# Patient Record
Sex: MALE | Race: WHITE | NOT HISPANIC OR LATINO | Employment: OTHER | URBAN - METROPOLITAN AREA
[De-identification: names, ages, dates, MRNs, and addresses within clinical notes are randomized per-mention and may not be internally consistent; named-entity substitution may affect disease eponyms.]

---

## 2017-12-07 PROBLEM — A41.9 SEVERE SEPSIS: Status: ACTIVE | Noted: 2017-12-07

## 2017-12-07 PROBLEM — I25.10 CORONARY ARTERY DISEASE INVOLVING NATIVE CORONARY ARTERY WITHOUT ANGINA PECTORIS: Status: ACTIVE | Noted: 2017-12-07

## 2017-12-07 PROBLEM — R65.20 SEVERE SEPSIS: Status: ACTIVE | Noted: 2017-12-07

## 2017-12-07 PROBLEM — I10 HYPERTENSION: Status: ACTIVE | Noted: 2017-12-07

## 2017-12-07 PROBLEM — Z72.0 TOBACCO ABUSE: Status: ACTIVE | Noted: 2017-12-07

## 2017-12-07 PROBLEM — N49.2 SCROTAL ABSCESS: Status: ACTIVE | Noted: 2017-12-07

## 2017-12-07 PROBLEM — E11.65 POORLY CONTROLLED TYPE 2 DIABETES MELLITUS: Status: ACTIVE | Noted: 2017-12-07

## 2017-12-11 PROBLEM — R65.20 SEVERE SEPSIS: Status: RESOLVED | Noted: 2017-12-07 | Resolved: 2017-12-11

## 2017-12-11 PROBLEM — A41.9 SEVERE SEPSIS: Status: RESOLVED | Noted: 2017-12-07 | Resolved: 2017-12-11

## 2017-12-13 PROBLEM — L98.492 CHRONIC ULCER SKIN, WITH FAT LAYER EXPOSED: Status: ACTIVE | Noted: 2017-12-13

## 2017-12-24 ENCOUNTER — HOSPITAL ENCOUNTER (INPATIENT)
Facility: HOSPITAL | Age: 52
LOS: 1 days | Discharge: HOME | DRG: 064 | End: 2017-12-25
Attending: EMERGENCY MEDICINE | Admitting: PSYCHIATRY & NEUROLOGY
Payer: MEDICARE

## 2017-12-24 DIAGNOSIS — N49.2 SCROTAL ABSCESS: ICD-10-CM

## 2017-12-24 DIAGNOSIS — R53.1 WEAKNESS: ICD-10-CM

## 2017-12-24 DIAGNOSIS — I63.441 EMBOLIC STROKE INVOLVING RIGHT CEREBELLAR ARTERY: Primary | ICD-10-CM

## 2017-12-24 DIAGNOSIS — E78.5 HYPERLIPIDEMIA, UNSPECIFIED HYPERLIPIDEMIA TYPE: ICD-10-CM

## 2017-12-24 DIAGNOSIS — I63.9 ISCHEMIC STROKE: ICD-10-CM

## 2017-12-24 DIAGNOSIS — Z92.82 S/P ADMN TPA IN DIFF FAC W/N LAST 24 HR BEF ADM TO CRNT FAC: ICD-10-CM

## 2017-12-24 DIAGNOSIS — I25.10 CORONARY ARTERY DISEASE INVOLVING NATIVE CORONARY ARTERY OF NATIVE HEART WITHOUT ANGINA PECTORIS: ICD-10-CM

## 2017-12-24 LAB
ABO + RH BLD: NORMAL
ALBUMIN SERPL BCP-MCNC: 3.4 G/DL
ALP SERPL-CCNC: 73 U/L
ALT SERPL W/O P-5'-P-CCNC: 13 U/L
AMPHET+METHAMPHET UR QL: NEGATIVE
ANION GAP SERPL CALC-SCNC: 16 MMOL/L
AST SERPL-CCNC: 16 U/L
BACTERIA #/AREA URNS AUTO: ABNORMAL /HPF
BARBITURATES UR QL SCN>200 NG/ML: NEGATIVE
BASOPHILS # BLD AUTO: 0.1 K/UL
BASOPHILS NFR BLD: 0.3 %
BENZODIAZ UR QL SCN>200 NG/ML: NEGATIVE
BILIRUB SERPL-MCNC: 0.3 MG/DL
BILIRUB UR QL STRIP: NEGATIVE
BLD GP AB SCN CELLS X3 SERPL QL: NORMAL
BUN SERPL-MCNC: 18 MG/DL
BZE UR QL SCN: NEGATIVE
CALCIUM SERPL-MCNC: 9.5 MG/DL
CANNABINOIDS UR QL SCN: NEGATIVE
CHLORIDE SERPL-SCNC: 100 MMOL/L
CHOLEST SERPL-MCNC: 255 MG/DL
CHOLEST/HDLC SERPL: 5.9 {RATIO}
CLARITY UR REFRACT.AUTO: ABNORMAL
CO2 SERPL-SCNC: 20 MMOL/L
COLOR UR AUTO: YELLOW
CREAT SERPL-MCNC: 1.3 MG/DL
CREAT UR-MCNC: 69 MG/DL
DIFFERENTIAL METHOD: ABNORMAL
EOSINOPHIL # BLD AUTO: 0.1 K/UL
EOSINOPHIL NFR BLD: 0.3 %
ERYTHROCYTE [DISTWIDTH] IN BLOOD BY AUTOMATED COUNT: 12.5 %
EST. GFR  (AFRICAN AMERICAN): >60 ML/MIN/1.73 M^2
EST. GFR  (NON AFRICAN AMERICAN): >60 ML/MIN/1.73 M^2
ESTIMATED AVG GLUCOSE: 292 MG/DL
GLUCOSE SERPL-MCNC: 313 MG/DL
GLUCOSE UR QL STRIP: ABNORMAL
HBA1C MFR BLD HPLC: 11.8 %
HCT VFR BLD AUTO: 38.9 %
HDLC SERPL-MCNC: 43 MG/DL
HDLC SERPL: 16.9 %
HGB BLD-MCNC: 13 G/DL
HGB UR QL STRIP: NEGATIVE
HYALINE CASTS UR QL AUTO: 20 /LPF
IMM GRANULOCYTES # BLD AUTO: 0.47 K/UL
IMM GRANULOCYTES NFR BLD AUTO: 1.6 %
INR PPP: 1.3
KETONES UR QL STRIP: NEGATIVE
LDLC SERPL CALC-MCNC: 164.6 MG/DL
LEUKOCYTE ESTERASE UR QL STRIP: NEGATIVE
LYMPHOCYTES # BLD AUTO: 2.1 K/UL
LYMPHOCYTES NFR BLD: 6.9 %
MAGNESIUM SERPL-MCNC: 1.7 MG/DL
MCH RBC QN AUTO: 30 PG
MCHC RBC AUTO-ENTMCNC: 33.4 G/DL
MCV RBC AUTO: 90 FL
METHADONE UR QL SCN>300 NG/ML: NEGATIVE
MICROSCOPIC COMMENT: ABNORMAL
MONOCYTES # BLD AUTO: 2.3 K/UL
MONOCYTES NFR BLD: 7.8 %
NEUTROPHILS # BLD AUTO: 24.9 K/UL
NEUTROPHILS NFR BLD: 83.1 %
NITRITE UR QL STRIP: NEGATIVE
NONHDLC SERPL-MCNC: 212 MG/DL
NRBC BLD-RTO: 0 /100 WBC
OPIATES UR QL SCN: NEGATIVE
PCP UR QL SCN>25 NG/ML: NEGATIVE
PH UR STRIP: 5 [PH] (ref 5–8)
PHOSPHATE SERPL-MCNC: 4.1 MG/DL
PLATELET # BLD AUTO: 375 K/UL
PLATELET BLD QL SMEAR: ABNORMAL
PMV BLD AUTO: 10.4 FL
POCT GLUCOSE: 174 MG/DL (ref 70–110)
POCT GLUCOSE: 231 MG/DL (ref 70–110)
POCT GLUCOSE: 288 MG/DL (ref 70–110)
POTASSIUM SERPL-SCNC: 4.3 MMOL/L
PROT SERPL-MCNC: 7.8 G/DL
PROT UR QL STRIP: ABNORMAL
PROTHROMBIN TIME: 13.2 SEC
RBC # BLD AUTO: 4.34 M/UL
RBC #/AREA URNS AUTO: 3 /HPF (ref 0–4)
SODIUM SERPL-SCNC: 136 MMOL/L
SP GR UR STRIP: 1.01 (ref 1–1.03)
SQUAMOUS #/AREA URNS AUTO: 0 /HPF
TOXICOLOGY INFORMATION: NORMAL
TRIGL SERPL-MCNC: 237 MG/DL
TSH SERPL DL<=0.005 MIU/L-ACNC: 1.86 UIU/ML
URN SPEC COLLECT METH UR: ABNORMAL
UROBILINOGEN UR STRIP-ACNC: NEGATIVE EU/DL
WBC # BLD AUTO: 29.96 K/UL
WBC #/AREA URNS AUTO: 3 /HPF (ref 0–5)
YEAST UR QL AUTO: ABNORMAL

## 2017-12-24 PROCEDURE — A4216 STERILE WATER/SALINE, 10 ML: HCPCS | Performed by: NURSE PRACTITIONER

## 2017-12-24 PROCEDURE — 25000003 PHARM REV CODE 250: Performed by: NURSE PRACTITIONER

## 2017-12-24 PROCEDURE — 97802 MEDICAL NUTRITION INDIV IN: CPT

## 2017-12-24 PROCEDURE — 87040 BLOOD CULTURE FOR BACTERIA: CPT | Mod: 59

## 2017-12-24 PROCEDURE — 99223 1ST HOSP IP/OBS HIGH 75: CPT | Mod: AI,,, | Performed by: NURSE PRACTITIONER

## 2017-12-24 PROCEDURE — G8997 SWALLOW GOAL STATUS: HCPCS | Mod: CH

## 2017-12-24 PROCEDURE — 85610 PROTHROMBIN TIME: CPT | Mod: 91

## 2017-12-24 PROCEDURE — S4991 NICOTINE PATCH NONLEGEND: HCPCS | Performed by: NURSE PRACTITIONER

## 2017-12-24 PROCEDURE — 63600175 PHARM REV CODE 636 W HCPCS: Performed by: NURSE PRACTITIONER

## 2017-12-24 PROCEDURE — 86901 BLOOD TYPING SEROLOGIC RH(D): CPT

## 2017-12-24 PROCEDURE — 99285 EMERGENCY DEPT VISIT HI MDM: CPT | Mod: ,,, | Performed by: EMERGENCY MEDICINE

## 2017-12-24 PROCEDURE — 81001 URINALYSIS AUTO W/SCOPE: CPT

## 2017-12-24 PROCEDURE — 99285 EMERGENCY DEPT VISIT HI MDM: CPT | Mod: 25

## 2017-12-24 PROCEDURE — 82962 GLUCOSE BLOOD TEST: CPT

## 2017-12-24 PROCEDURE — 96372 THER/PROPH/DIAG INJ SC/IM: CPT

## 2017-12-24 PROCEDURE — 80053 COMPREHEN METABOLIC PANEL: CPT | Mod: 91

## 2017-12-24 PROCEDURE — 93010 ELECTROCARDIOGRAM REPORT: CPT | Mod: ,,, | Performed by: INTERNAL MEDICINE

## 2017-12-24 PROCEDURE — 20000000 HC ICU ROOM

## 2017-12-24 PROCEDURE — 86900 BLOOD TYPING SEROLOGIC ABO: CPT

## 2017-12-24 PROCEDURE — 83735 ASSAY OF MAGNESIUM: CPT

## 2017-12-24 PROCEDURE — 85025 COMPLETE CBC W/AUTO DIFF WBC: CPT | Mod: 91

## 2017-12-24 PROCEDURE — 84100 ASSAY OF PHOSPHORUS: CPT

## 2017-12-24 PROCEDURE — 99233 SBSQ HOSP IP/OBS HIGH 50: CPT | Mod: GC,,, | Performed by: PSYCHIATRY & NEUROLOGY

## 2017-12-24 PROCEDURE — 80307 DRUG TEST PRSMV CHEM ANLYZR: CPT

## 2017-12-24 PROCEDURE — 83036 HEMOGLOBIN GLYCOSYLATED A1C: CPT

## 2017-12-24 PROCEDURE — 92610 EVALUATE SWALLOWING FUNCTION: CPT

## 2017-12-24 PROCEDURE — G8998 SWALLOW D/C STATUS: HCPCS | Mod: CH

## 2017-12-24 PROCEDURE — G8996 SWALLOW CURRENT STATUS: HCPCS | Mod: CH

## 2017-12-24 PROCEDURE — 84443 ASSAY THYROID STIM HORMONE: CPT

## 2017-12-24 PROCEDURE — 80061 LIPID PANEL: CPT

## 2017-12-24 RX ORDER — POTASSIUM CHLORIDE 20 MEQ/1
40 TABLET, EXTENDED RELEASE ORAL ONCE
Status: COMPLETED | OUTPATIENT
Start: 2017-12-24 | End: 2017-12-24

## 2017-12-24 RX ORDER — SODIUM CHLORIDE 0.9 % (FLUSH) 0.9 %
3 SYRINGE (ML) INJECTION EVERY 8 HOURS
Status: DISCONTINUED | OUTPATIENT
Start: 2017-12-24 | End: 2017-12-25 | Stop reason: HOSPADM

## 2017-12-24 RX ORDER — POTASSIUM CHLORIDE 20 MEQ/15ML
40 SOLUTION ORAL ONCE
Status: DISCONTINUED | OUTPATIENT
Start: 2017-12-24 | End: 2017-12-24

## 2017-12-24 RX ORDER — ACETAMINOPHEN 325 MG/1
650 TABLET ORAL EVERY 6 HOURS PRN
Status: DISCONTINUED | OUTPATIENT
Start: 2017-12-24 | End: 2017-12-25 | Stop reason: HOSPADM

## 2017-12-24 RX ORDER — ONDANSETRON 2 MG/ML
4 INJECTION INTRAMUSCULAR; INTRAVENOUS EVERY 8 HOURS PRN
Status: DISCONTINUED | OUTPATIENT
Start: 2017-12-24 | End: 2017-12-25 | Stop reason: HOSPADM

## 2017-12-24 RX ORDER — IBUPROFEN 200 MG
1 TABLET ORAL DAILY
Status: DISCONTINUED | OUTPATIENT
Start: 2017-12-24 | End: 2017-12-25 | Stop reason: HOSPADM

## 2017-12-24 RX ORDER — LISINOPRIL 20 MG/1
20 TABLET ORAL DAILY
Status: DISCONTINUED | OUTPATIENT
Start: 2017-12-24 | End: 2017-12-25 | Stop reason: HOSPADM

## 2017-12-24 RX ORDER — LABETALOL HYDROCHLORIDE 5 MG/ML
10 INJECTION, SOLUTION INTRAVENOUS EVERY 4 HOURS PRN
Status: DISCONTINUED | OUTPATIENT
Start: 2017-12-24 | End: 2017-12-25 | Stop reason: HOSPADM

## 2017-12-24 RX ORDER — ATORVASTATIN CALCIUM 20 MG/1
40 TABLET, FILM COATED ORAL DAILY
Status: DISCONTINUED | OUTPATIENT
Start: 2017-12-24 | End: 2017-12-25 | Stop reason: HOSPADM

## 2017-12-24 RX ORDER — NICARDIPINE HYDROCHLORIDE 0.2 MG/ML
2.5 INJECTION INTRAVENOUS CONTINUOUS PRN
Status: DISCONTINUED | OUTPATIENT
Start: 2017-12-24 | End: 2017-12-25 | Stop reason: HOSPADM

## 2017-12-24 RX ORDER — GLUCAGON 1 MG
1 KIT INJECTION
Status: DISCONTINUED | OUTPATIENT
Start: 2017-12-24 | End: 2017-12-25 | Stop reason: HOSPADM

## 2017-12-24 RX ORDER — INSULIN ASPART 100 [IU]/ML
1-10 INJECTION, SOLUTION INTRAVENOUS; SUBCUTANEOUS EVERY 6 HOURS PRN
Status: DISCONTINUED | OUTPATIENT
Start: 2017-12-24 | End: 2017-12-25 | Stop reason: HOSPADM

## 2017-12-24 RX ORDER — FENTANYL CITRATE 50 UG/ML
12.5 INJECTION, SOLUTION INTRAMUSCULAR; INTRAVENOUS ONCE
Status: COMPLETED | OUTPATIENT
Start: 2017-12-24 | End: 2017-12-24

## 2017-12-24 RX ADMIN — ACETAMINOPHEN 650 MG: 325 TABLET ORAL at 04:12

## 2017-12-24 RX ADMIN — SODIUM CHLORIDE, PRESERVATIVE FREE 3 ML: 5 INJECTION INTRAVENOUS at 09:12

## 2017-12-24 RX ADMIN — ATORVASTATIN CALCIUM 40 MG: 20 TABLET, FILM COATED ORAL at 08:12

## 2017-12-24 RX ADMIN — FENTANYL CITRATE 12.5 MCG: 50 INJECTION INTRAMUSCULAR; INTRAVENOUS at 09:12

## 2017-12-24 RX ADMIN — NICOTINE 1 PATCH: 21 PATCH, EXTENDED RELEASE TRANSDERMAL at 11:12

## 2017-12-24 RX ADMIN — INSULIN ASPART 4 UNITS: 100 INJECTION, SOLUTION INTRAVENOUS; SUBCUTANEOUS at 10:12

## 2017-12-24 RX ADMIN — LISINOPRIL 20 MG: 20 TABLET ORAL at 08:12

## 2017-12-24 RX ADMIN — INSULIN ASPART 2 UNITS: 100 INJECTION, SOLUTION INTRAVENOUS; SUBCUTANEOUS at 05:12

## 2017-12-24 RX ADMIN — ACETAMINOPHEN 650 MG: 325 TABLET ORAL at 08:12

## 2017-12-24 RX ADMIN — POTASSIUM CHLORIDE 40 MEQ: 1500 TABLET, EXTENDED RELEASE ORAL at 05:12

## 2017-12-24 NOTE — HOSPITAL COURSE
12/24: Patient admitted to [facility] s/p tPA   12/25: Discharge vs stepdown pending completion of work-up

## 2017-12-24 NOTE — ED NOTES
"Pt c/o right leg pain - describes it as " wagner horse " requesting pain medication - BELL PAGED X 2 -  "

## 2017-12-24 NOTE — PLAN OF CARE
Problem: Patient Care Overview  Goal: Plan of Care Review  Outcome: Ongoing (interventions implemented as appropriate)  POC reviewed with pt and family at 1600. Pt verbalized understanding. Questions and concerns addressed with pt and family.  Pt progressing toward goals. Will continue to monitor. See flowsheets for full assessment and VS info.

## 2017-12-24 NOTE — HOSPITAL COURSE
Mr. Gibson was admitted to [facility] for 24-hr close monitoring s/p tPA-administration. MRI Brain showed acute R cerebellar infarct. Stroke workup was completed except for Echocardiogram, as department was closed for holiday. Pt eager to go and we did not want this to delay his discharge so pt agreed to obtain study as an outpatient. Discussed his uncontrolled Diabetes (A1c 11.8%) and the need for strict management to reduce his risk of stroke. Pt states he has been without insurance and medications for several months, but will have insurance starting Jan 1 and will be able resume all medications, appts, etc at that time. Recommend ASA 81mg Daily and Atorvastatin 40mg Daily. Also recommend maximal risk factor modification for secondary stroke prevention including control of existing HTN, HLD, DM, Obesity, diet, exercise, smoking cessation, etc. Mr. Gibson was evaluated and treated by PT, OT, and SLP who recommended d/c home with no further therapy needs. He was instructed to make hospital follow-up with his PCP within 1 week of discharge, and to follow up with Vascular Neurology within 4-6 weeks. Filled remainder of Bactrim course for pt's known subcutaneous infection, previously on prior to hospital presentation. Pt neurologically stable for d/c home with family.

## 2017-12-24 NOTE — HPI
51 y/o male who started with ringing in his left ear that got progressively worse and then all of a sudden started with twitches in right hand/arm and then leg. He was unable to walk and had to have assistance to get to the car to go to [facility]. With no improvement in symptoms telemedicine call with Dr Rodgers was done with CT scan with no acute process recommendation was to give IV TPA and transfer to [facility]. Patient states that he then got a headache and vomited prior to getting to the hospital.  Upon arrival patient with no neuro deficits. States back to normal.   Risk factors HTN, DM, CAD

## 2017-12-24 NOTE — PT/OT/SLP EVAL
Speech Language Pathology Evaluation  Bedside Swallow    Patient Name:  Ismael Gibson   MRN:  [id number]  Admitting Diagnosis: S/P admn tPA in diff fac w/n last 24 hr bef adm to crnt fac    Recommendations:                 General Recommendations:  Speech language evaluation and Cognitive-linguistic evaluation  Diet recommendations:  Regular, Thin   Aspiration Precautions: Standard aspiration precautions   General Precautions: Standard,    Communication strategies:  none    History:     Past Medical History:   Diagnosis Date    Coronary artery disease     Diabetes mellitus     Hypertension        Past Surgical History:   Procedure Laterality Date    CORONARY STENT PLACEMENT      x9       Social History: Patient lives with spouse.    Prior Intubation HX:  None this visit      Modified Barium Swallow: None this visit    Chest X-Rays: 12/24: No significant interval change from prior.    Prior diet:  No reported restrictions; no SLP notes in Epic.        Subjective     Pt awake; pleasant  Patient goals: did not state     Objective:     Oral Musculature Evaluation  · Oral Musculature: WFL  · Dentition: teeth in poor condition  · Secretion Management: adequate  · Mucosal Quality: adequate  · Mandibular Strength and Mobility: WFL  · Oral Labial Strength and Mobility: WNL  · Lingual Strength and Mobility: WNL  · Buccal Strength and Mobility: WNL  · Volitional Cough: adequate  · Volitional Swallow: able to demostrate; WFl  · Voice Prior to PO Intake: clear    Bedside Swallow Eval:   Consistencies Assessed:  · Thin liquids 4 oz via ice, spoon, cup, straw  · Puree 3 full spoonfuls  · Solids 1/4 jaime cracker     Oral Phase:   · mild excess to mastication 2/2 poor dentition  · WFL    Pharyngeal Phase:   · no overt clinical signs/symptoms of aspiration  · no overt clinical signs/symptoms of pharyngeal dysphagia    Compensatory Strategies  · None    Pt denied cognitive-linguistic changes sp admit. Skilled education provided  on aspiration precautions and diet recommendations. Whiteboard updated with diet recommendations/aspiration precautions. No further questions.      Assessment:     Ismael Gibson is a 52 y.o. male with an SLP diagnosis of swallow WFL.      Goals:    SLP Goals        Problem: SLP Goal    Goal Priority Disciplines Outcome   SLP Goal     SLP    Description:  Speech Language Pathology Goals  Goals expected to be met by 12/31  1. Pt will participate in speech language cognitive eval to determine need for intervention.                       Plan:     · Patient to be seen:  5 x/week   · Plan of Care expires:  01/22/18  · Plan of Care reviewed with:    pt and spouse  · SLP Follow-Up:  Yes       Discharge recommendations:   (pending PT OT recs; may not need SLPC after DC)   Barriers to Discharge:  see PT OT note    Time Tracking:     SLP Treatment Date:   12/24/17  Speech Start Time:  0834  Speech Stop Time:  0842     Speech Total Time (min):  8 min    Billable Minutes: Eval Swallow and Oral Function 8      Laura Sandra M.A. CCC-SLP  Speech Language Pathologist  [phone]  12/24/2017

## 2017-12-24 NOTE — ASSESSMENT & PLAN NOTE
Antithrombotics: once out of TPA window aspirin 81 mg daily begin on 12-25-17 0900 if no conversion  Statin: Lipitor 40 mg daily  Therapy: PT/OT/ST  Risk factor modification: smoker, HTN, DM, CAD  Diagnostics: MRI/MRA brain and neck, 2D Echo, lipid, Hgb A1C  VTE: SCD's may begin heparin on 12-25-17 at 0600

## 2017-12-24 NOTE — ASSESSMENT & PLAN NOTE
S/p tPA   --Continue Neuro checks q 1hr  -- Vascular Neurology consulted  -- Pending MRI/MRA brain and MRA neck w contrast (12/24)  -- SBP goal <180  -- PT/OT/Speech

## 2017-12-24 NOTE — ED NOTES
Ismael JEFFERY Jr Arthur, an 52 y.o. male presents to the ED  For neural eval after TPA administration -  Wife reports that he was laying on bed playing a game when his right arm and leg became numb around 2354 last night - she reports that when walking to the car pt vomited x 2 , and became lethargic - pt was given 8 mg TPA bolus at 0136 and 69 mg infusion at 0137 he reports improvement of numbness to right leg      Chief Complaint   Patient presents with    Numbness     presents to [facility] as a transfer from [facility] for neuro eval - pt presented to outside facility with right arm and leg numbness and generalized weakness - wife reports emesis  x 2      Review of patient's allergies indicates:   Allergen Reactions    Pcn [penicillins]      Past Medical History:   Diagnosis Date    Coronary artery disease     Diabetes mellitus     Hypertension

## 2017-12-24 NOTE — ED PROVIDER NOTES
Encounter Date: 12/24/2017       History     Chief Complaint   Patient presents with    Numbness     [facility] as a transfer from [facility] for neuro eval - pt presented to outside facility with right arm and leg numbness and generalized weakness - wife reports emesis  x 2      52y gentleman transferred after receiving TPA at outside facility. He presented there with acute onset right sided weakness and facial droop. No has associated slurred speech.              The history is provided by the patient and the EMS personnel.     Review of patient's allergies indicates:   Allergen Reactions    Pcn [penicillins]      Past Medical History:   Diagnosis Date    Coronary artery disease     Diabetes mellitus     Hypertension      Past Surgical History:   Procedure Laterality Date    CORONARY STENT PLACEMENT      x9     No family history on file.  Social History   Substance Use Topics    Smoking status: Current Every Day Smoker     Packs/day: 0.50     Types: Cigarettes    Smokeless tobacco: Never Used    Alcohol use No     Review of Systems   Constitutional: Negative for fever.   HENT: Negative for sore throat.    Respiratory: Negative for shortness of breath.    Cardiovascular: Negative for chest pain.   Gastrointestinal: Negative for nausea.   Genitourinary: Negative for dysuria.   Musculoskeletal: Negative for back pain.   Skin: Negative for rash.   Neurological: Positive for weakness.   Hematological: Does not bruise/bleed easily.   All other systems reviewed and are negative.      Physical Exam     Initial Vitals [12/24/17 0309]   BP Pulse Resp Temp SpO2   (!) 140/85 (!) 116 18 97.6 °F (36.4 °C) 100 %      MAP       103.33         Physical Exam    Vitals reviewed.  Constitutional: Vital signs are normal. He appears well-developed and well-nourished.   HENT:   Head: Normocephalic and atraumatic.   Mouth/Throat: Oropharynx is clear and moist.   Eyes: Conjunctivae and EOM are normal. Pupils are equal, round, and  reactive to light.   Neck: Trachea normal and normal range of motion. Neck supple.   Cardiovascular: Normal rate, regular rhythm, normal heart sounds and normal pulses.   Pulmonary/Chest: Breath sounds normal. No respiratory distress.   Abdominal: Soft. Normal appearance and bowel sounds are normal. There is no tenderness.   Musculoskeletal: Normal range of motion.   Neurological: He is alert and oriented to person, place, and time. He has normal strength.   Skin: Skin is warm and dry.         ED Course   Procedures  Labs Reviewed   COMPREHENSIVE METABOLIC PANEL - Abnormal; Notable for the following:        Result Value    CO2 20 (*)     Glucose 313 (*)     Albumin 3.4 (*)     All other components within normal limits   LIPID PANEL - Abnormal; Notable for the following:     Cholesterol 255 (*)     Triglycerides 237 (*)     LDL Cholesterol 164.6 (*)     HDL/Chol Ratio 16.9 (*)     Total Cholesterol/HDL Ratio 5.9 (*)     All other components within normal limits   HEMOGLOBIN A1C - Abnormal; Notable for the following:     Hemoglobin A1C 11.8 (*)     Estimated Avg Glucose 292 (*)     All other components within normal limits   CBC W/ AUTO DIFFERENTIAL - Abnormal; Notable for the following:     WBC 29.96 (*)     RBC 4.34 (*)     Hemoglobin 13.0 (*)     Hematocrit 38.9 (*)     Platelets 375 (*)     Immature Granulocytes 1.6 (*)     Gran # 24.9 (*)     Immature Grans (Abs) 0.47 (*)     Mono # 2.3 (*)     Gran% 83.1 (*)     Lymph% 6.9 (*)     All other components within normal limits   PROTIME-INR - Abnormal; Notable for the following:     Prothrombin Time 13.2 (*)     INR 1.3 (*)     All other components within normal limits   URINALYSIS - Abnormal; Notable for the following:     Appearance, UA Hazy (*)     Protein, UA 2+ (*)     Glucose, UA 3+ (*)     All other components within normal limits   URINALYSIS MICROSCOPIC - Abnormal; Notable for the following:     Bacteria, UA Few (*)     Hyaline Casts, UA 20 (*)     All other  components within normal limits   POCT GLUCOSE - Abnormal; Notable for the following:     POCT Glucose 288 (*)     All other components within normal limits   POCT GLUCOSE - Abnormal; Notable for the following:     POCT Glucose 231 (*)     All other components within normal limits   CULTURE, RESPIRATORY   TSH   MAGNESIUM   PHOSPHORUS   DRUG SCREEN PANEL, URINE EMERGENCY   TYPE & SCREEN   POCT GLUCOSE MONITORING CONTINUOUS   POCT GLUCOSE MONITORING CONTINUOUS     EKG Readings: (Independently Interpreted)   Initial Reading: No STEMI. Rhythm: Sinus Tachycardia. Heart Rate: 117.          Medical Decision Making:   History:   I obtained history from: someone other than patient.  Old Medical Records: I decided to obtain old medical records.              Attending Attestation:             Attending ED Notes:   TPA transfer from outside facility. No deficits at this time. Discussed with vascular neurology, will admit to Neuro ICU.          ED Course      Clinical Impression:   The primary encounter diagnosis was Weakness. Diagnoses of Ischemic stroke and S/P admn tPA in diff fac w/n last 24 hr bef adm to crnt fac were also pertinent to this visit.    Disposition:   Disposition: Admitted  Condition: Enrike Benitez MD  12/24/17 1068

## 2017-12-24 NOTE — PLAN OF CARE
Problem: SLP Goal  Goal: SLP Goal  Speech Language Pathology Goals  Goals expected to be met by 12/31  1. Pt will participate in speech language cognitive eval to determine need for intervention.     Swallow evaluation completed with initiated POC.    Laura Sandra M.A. CCC-SLP  Speech Language Pathologist  [phone]  12/24/2017

## 2017-12-24 NOTE — HPI
Mr. Gibson is a 52 year old M with PMHx of CAD, HTN, DM and tobacco abuse presenting to [facility] s/p tPA for R sided numbness. Per patient's  wife, Mr. Gibson had a sudden onset of right-sided numbness which started approximately 11:54 PM (12/23), had 1 episode of emesis,and complained of dizziness and headache prior to presenting to outside facility. Patient stated that at baseline has some numbness in his lower extremities due to neuropathy, however the numbness experienced on the R side of his body  was different. He also complained of not having control or strength in right side of his body however his symptoms  has resolved s/p tPA. Patient admitted to [facility] for higher level of care.

## 2017-12-24 NOTE — SUBJECTIVE & OBJECTIVE
Past Medical History:   Diagnosis Date    Coronary artery disease     Diabetes mellitus     Hypertension      Past Surgical History:   Procedure Laterality Date    CORONARY STENT PLACEMENT      x9     No family history on file.  Social History   Substance Use Topics    Smoking status: Current Every Day Smoker     Packs/day: 0.50     Types: Cigarettes    Smokeless tobacco: Never Used    Alcohol use No     Review of patient's allergies indicates:   Allergen Reactions    Pcn [penicillins]        Medications: I have reviewed the current medication administration record.      (Not in a hospital admission)    Review of Systems   Constitutional: Negative for chills and fever.   HENT: Negative for ear discharge and ear pain.    Eyes: Negative for pain and itching.   Respiratory: Negative for cough and shortness of breath.    Cardiovascular: Negative for chest pain and leg swelling.   Gastrointestinal: Positive for nausea and vomiting.   Endocrine: Negative for polyphagia and polyuria.   Genitourinary: Negative for difficulty urinating and dysuria.   Musculoskeletal: Negative for arthralgias and back pain.   Skin: Negative for rash and wound.   Neurological: Positive for weakness and headaches.     Objective:     Vital Signs (Most Recent):  Temp: 97.6 °F (36.4 °C) (12/24/17 0309)  Pulse: (!) 121 (12/24/17 0358)  Resp: 16 (12/24/17 0358)  BP: (!) 145/70 (12/24/17 0351)  SpO2: 100 % (12/24/17 0358)    Vital Signs Range (Last 24H):  Temp:  [97.6 °F (36.4 °C)-97.8 °F (36.6 °C)]   Pulse:  []   Resp:  [12-23]   BP: (140-194)/()   SpO2:  [94 %-100 %]     Physical Exam   Constitutional: He is oriented to person, place, and time. He appears well-developed and well-nourished. No distress.   HENT:   Head: Normocephalic and atraumatic.   Eyes: EOM are normal. Pupils are equal, round, and reactive to light.   Neck: Normal range of motion.   Cardiovascular: Normal rate.    Pulmonary/Chest: Effort normal.    Abdominal: Soft.   Musculoskeletal: Normal range of motion.   Neurological: He is alert and oriented to person, place, and time.   Skin: Skin is warm and dry. He is not diaphoretic.   Nursing note and vitals reviewed.      Neurological Exam:   LOC: alert  Attention Span: Good   Language: No aphasia  Articulation: No dysarthria  Orientation: Person, Place, Time   Visual Fields: Full  EOM (CN III, IV, VI): Full/intact  Pupils (CN II, III): PERRL  Facial Sensation (CN V): Normal  Facial Movement (CN VII): Symmetric facial expression    Gag Reflex: present  Reflexes: flexor plantar responses bilaterally  Motor: Normal 5/5 all 4  Cebellar: No evidence of appendicular or axial ataxia  Sensation: Intact to light touch, temperature and vibration  Tone: Normal tone throughout      Laboratory:  CMP:   Recent Labs  Lab 12/24/17  0055   CALCIUM 9.9   ALBUMIN 4.1   PROT 8.0      K 3.2*   CO2 24      BUN 15   CREATININE 0.98   ALKPHOS 75   ALT 30   AST 15*   BILITOT 0.4     CBC:   Recent Labs  Lab 12/24/17  0055   WBC 16.51*   RBC 4.50*   HGB 13.8*   HCT 39.7*   *   MCV 88   MCH 30.7   MCHC 34.8     Lipid Panel: No results for input(s): CHOL, LDLCALC, HDL, TRIG in the last 168 hours.  Coagulation:   Recent Labs  Lab 12/24/17  0055   INR 1.0     Hgb A1C: No results for input(s): HGBA1C in the last 168 hours.  TSH: No results for input(s): TSH in the last 168 hours.    Diagnostic Results:      Brain imaging:      Vessel Imaging:      Cardiac Evaluation:   EKG 12-23-17 unconfirmed results:  Sinus tachycardia  Possible Inferior infarct ,age undetermined  Anterior infarct ,age undetermined

## 2017-12-24 NOTE — H&P
[facility]  Neurocritical Care  History & Physical    Admit Date: 12/24/2017  Service Date: 12/24/2017  Length of Stay: 0    Subjective:     Chief Complaint: S/P admn tPA in diff fac w/n last 24 hr bef adm to crnt fac    History of Present Illness: Mr. Gibson is a 52 year old M with PMHx of CAD, HTN, DM and tobacco abuse presenting to [facility] s/p tPA for R sided numbness. Per patient's  wife, Mr. Gibson had a sudden onset of right-sided numbness which started approximately 11:54 PM (12/23), had 1 episode of emesis,and complained of dizziness and headache prior to presenting to outside facility. Patient stated that at baseline has some numbness in his lower extremities due to neuropathy, however the numbness experienced on the R side of his body  was different. He also complained of not having control or strength in right side of his body however his symptoms  has resolved s/p tPA. Patient admitted to [facility] for higher level of care.         Past Medical History:   Diagnosis Date    Coronary artery disease     Diabetes mellitus     Hypertension      Past Surgical History:   Procedure Laterality Date    CORONARY STENT PLACEMENT      x9      Current Facility-Administered Medications on File Prior to Encounter   Medication Dose Route Frequency Provider Last Rate Last Dose    [COMPLETED] alteplase (ACTIVASE) 100 mg injection 69 mg  0.81 mg/kg Intravenous ED 1 Time Ida Lunsford MD 69 mL/hr at 12/24/17 0137 69 mg at 12/24/17 0137    [COMPLETED] ALTEPLASE IV BOLUS bolus from vial 8 mg  0.09 mg/kg Intravenous ED 1 Time Ida Lunsford MD   Stopped at 12/24/17 0137    [COMPLETED] ondansetron injection 4 mg  4 mg Intravenous ED 1 Time Ida Lunsford MD   4 mg at 12/24/17 0128    [COMPLETED] ondansetron injection 4 mg  4 mg Intravenous ED 1 Time Ida Lunsford MD   4 mg at 12/24/17 0213    [DISCONTINUED] niCARdipine 40 mg/200 mL infusion  2.5 mg/hr Intravenous  Continuous Ida Ana Laura Lunsford MD 12.5 mL/hr at 12/24/17 0139 2.5 mg/hr at 12/24/17 0139     Current Outpatient Prescriptions on File Prior to Encounter   Medication Sig Dispense Refill    acetaminophen-codeine 300-30mg (TYLENOL #3) 300-30 mg Tab Take 1 tablet by mouth as needed.      blood sugar diagnostic Strp 1 each by Misc.(Non-Drug; Combo Route) route 2 (two) times daily before meals. For True Metrix meter 100 each 3    fluconazole (DIFLUCAN) 100 MG tablet Take 1 tablet (100 mg total) by mouth once daily. 7 tablet 0    insulin glargine (LANTUS SOLOSTAR) 100 unit/mL (3 mL) InPn pen Inject 40 Units into the skin every evening. (Patient taking differently: Inject 50 Units into the skin every evening. ) 12 mL 1    lancets Misc 1 each by Misc.(Non-Drug; Combo Route) route 2 (two) times daily before meals. For True Metrix meter 100 each 3    lidocaine HCL 4% (XYLOCAINE) 4 % (40 mg/mL) external solution Apply 4 mLs topically daily as needed. Dressing changes 50 mL 0    lisinopril (PRINIVIL,ZESTRIL) 20 MG tablet Take 1 tablet (20 mg total) by mouth once daily. 30 tablet 1    nicotine (NICODERM CQ) 21 mg/24 hr Place 1 patch onto the skin once daily. 30 patch 0    pregabalin (LYRICA) 300 MG Cap Take 300 mg by mouth 2 (two) times daily.      simvastatin (ZOCOR) 40 MG tablet Take 1 tablet (40 mg total) by mouth every evening. 30 tablet 11      Allergies: Pcn [penicillins]    No family history on file.  Social History   Substance Use Topics    Smoking status: Current Every Day Smoker     Packs/day: 0.50     Types: Cigarettes    Smokeless tobacco: Never Used    Alcohol use No     Review of Systems     Review of symptoms  Constitutional: Denies fevers or chills. Stated L eye blindness  Pulmonary: Denies shortness of breath or cough.  Cardiology: Denies chest pain or palpitations.  GI: Denies abdominal pain or constipation.  Neurologic: Denies headache, complained of numbness sarah. lower extremities d/t  neuropathy.   Objective:     Vitals:  Temp: 97.6 °F (36.4 °C) (12/24/17 0309)  Pulse: (!) 120 (12/24/17 0406)  Resp: 19 (12/24/17 0406)  BP: 137/63 (12/24/17 0406)  SpO2: 100 % (12/24/17 0406)    Temp:  [97.6 °F (36.4 °C)-97.8 °F (36.6 °C)] 97.6 °F (36.4 °C)  Pulse:  [] 120  Resp:  [12-23] 19  SpO2:  [94 %-100 %] 100 %  BP: (137-194)/() 137/63              No intake/output data recorded.    Physical Exam    Physical Exam:  GA: Alert, comfortable, no acute distress.   HEENT: No scleral icterus or JVD.   Pulmonary: Clear to auscultation A. No wheezing, crackles, or rhonchi.  Cardiac: RRR S1 & S2 w/o rubs/murmurs/gallops.   Abdominal: Bowel sounds present x 4. No appreciable hepatosplenomegaly.  Skin: No jaundice, rashes, or visible lesions.  Neuro:  --GCS: E4 V5 M6  --Mental Status:  Awake,   --CN II-XII grossly intact.   --Pupils 3mm, PERRL.   --Corneal reflex, gag, cough intact.  --LUE strength: 5/5  --RUE strength: 5/5  --LLE strength: 5/5  --RLE strength: 5/5    Today I personally reviewed pertinent medications, lines/drains/airways, imaging, lab results,         Assessment/Plan:     Cardiac/Vascular   Hyperlipidemia    Lipid panel pending  Atorvastatin 40 daily         Hypertension    SBP< 180  Continue home dose Lisinopril  Cardene gtt  PRN Labetalol  Pending Echo        Renal/   Scrotal abscess    recently in the hospital for perineal abscess which was I&D per wife        Hematology   * S/P admn tPA in diff fac w/n last 24 hr bef adm to crnt fac      S/p tPA   --Continue Neuro checks q 1hr  -- Vascular Neurology consulted  -- Pending MRI/MRA brain and MRA neck w contrast (12/24)  -- SBP goal <180  -- PT/OT/Speech            Endocrine   Poorly controlled type 2 diabetes mellitus    A1C 13.6 (12/8)  Acuchecks  PRN SSI        Other   Tobacco abuse    Continue home dose nicotine patch            Prophylaxis:  Venous Thromboembolism: mechanical  Stress Ulcer: NA  Ventilator Pneumonia: not applicable      Activity Orders          None        Full Code    Joana Arreola NP  Neurocritical Care  [facility]

## 2017-12-24 NOTE — CONSULTS
[facility]  Vascular Neurology  Comprehensive Stroke Center  Consult Note    Inpatient consult to Vascular (Stroke) Neurology  Consult performed by: NORMA NIETO  Consult ordered by: RUFUS MUSA  Reason for consult: stroke        Assessment/Plan:     53 y/o male with right sided weakness received IV TPA at [facility]. Since no neuro deficits no CTA head and neck done so no LVO so no IR.    * Right sided weakness    Antithrombotics: once out of TPA window aspirin 81 mg daily begin on 12-25-17 0900 if no conversion  Statin: Lipitor 40 mg daily  Therapy: PT/OT/ST  Risk factor modification: smoker, HTN, DM, CAD  Diagnostics: MRI/MRA brain and neck, 2D Echo, lipid, Hgb A1C  VTE: SCD's may begin heparin on 12-25-17 at 0600        Hypertension    Stroke risk factor  SBP <180 due to TPA        Poorly controlled type 2 diabetes mellitus    Stroke risk factor  Hgb A1C   SSI        Coronary artery disease involving native coronary artery without angina pectoris    Stroke risk factor        Tobacco abuse    Stroke risk factor  Smoking cessation  Nicotine patch        Tissue plasminogen activator (t-PA) administered at other facility within 24 hours prior to current admission    Close monitoring in NCCU for 24 hours post administration            STROKE DOCUMENTATION     Acute Stroke Times   Last Known Normal Date: 11/23/17  Last Known Normal Time: 2354  Symptom Onset Date: 11/23/17  Symptom Onset Time: 2354  Stroke Team Called Date: 11/24/17  Stroke Team Called Time: 0103  Stroke Team Arrival Date: 11/24/17  Stroke Team Arrival Time: 0310  CT Interpretation Time: 0105  Decision to Treat Time for Alteplase: 0136    NIH Scale:  Interval: 1hour post tPA or Endovascular procedure completion  1a. Level Of Consciousness: 0-->Alert: keenly responsive  1b. LOC Questions: 0-->Answers both questions correctly  1c. LOC Commands: 0-->Performs both tasks correctly  2. Best Gaze: 0-->Normal  3. Visual:  0-->No visual loss  4. Facial Palsy: 0-->Normal symmetrical movements  5a. Motor Arm, Left: 0-->No drift: limb holds 90 (or 45) degrees for full 10 secs  5b. Motor Arm, Right: 0-->No drift: limb holds 90 (or 45) degrees for full 10 secs  6a. Motor Leg, Left: 0-->No drift: leg holds 30 degree position for full 5 secs  6b. Motor Leg, Right: 0-->No drift: leg holds 30 degree position for full 5 secs  7. Limb Ataxia: 0-->Absent  8. Sensory: 0-->Normal: no sensory loss  9. Best Language: 0-->No aphasia: normal  10. Dysarthria: 0-->Normal  11. Extinction and Inattention (formerly Neglect): 0-->No abnormality  Total (NIH Stroke Scale): 0    Modified Mercer Score: 0  Anuradha Coma Scale:15   ABCD2 Score:    [id number] Score:   HAS -BLED Score:   ICH Score:   Hunt & Nelson Classification:       Thrombolysis Candidate? Yes, given prior to arrival at outside hospital      Interventional Revascularization Candidate?   Is the patient eligible for mechanical endovascular reperfusion (PASQUALE)?  No; No large vessel occlusion      Hemorrhagic change of an Ischemic Stroke: Does this patient have an ischemic stroke with hemorrhagic changes? No     Subjective:     History of Present Illness:  53 y/o male who started with ringing in his left ear that got progressively worse and then all of a sudden started with twitches in right hand/arm and then leg. He was unable to walk and had to have assistance to get to the car to go to [facility]. With no improvement in symptoms telemedicine call with Dr Rodgers was done with CT scan with no acute process recommendation was to give IV TPA and transfer to [facility]. Patient states that he then got a headache and vomited prior to getting to the hospital.  Upon arrival patient with no neuro deficits. States back to normal.   Risk factors HTN, DM, CAD        Past Medical History:   Diagnosis Date    Coronary artery disease     Diabetes mellitus     Hypertension      Past Surgical History:    Procedure Laterality Date    CORONARY STENT PLACEMENT      x9     No family history on file.  Social History   Substance Use Topics    Smoking status: Current Every Day Smoker     Packs/day: 0.50     Types: Cigarettes    Smokeless tobacco: Never Used    Alcohol use No     Review of patient's allergies indicates:   Allergen Reactions    Pcn [penicillins]        Medications: I have reviewed the current medication administration record.      (Not in a hospital admission)    Review of Systems   Constitutional: Negative for chills and fever.   HENT: Negative for ear discharge and ear pain.    Eyes: Negative for pain and itching.   Respiratory: Negative for cough and shortness of breath.    Cardiovascular: Negative for chest pain and leg swelling.   Gastrointestinal: Positive for nausea and vomiting.   Endocrine: Negative for polyphagia and polyuria.   Genitourinary: Negative for difficulty urinating and dysuria.   Musculoskeletal: Negative for arthralgias and back pain.   Skin: Negative for rash and wound.   Neurological: Positive for weakness and headaches.     Objective:     Vital Signs (Most Recent):  Temp: 97.6 °F (36.4 °C) (12/24/17 0309)  Pulse: (!) 121 (12/24/17 0358)  Resp: 16 (12/24/17 0358)  BP: (!) 145/70 (12/24/17 0351)  SpO2: 100 % (12/24/17 0358)    Vital Signs Range (Last 24H):  Temp:  [97.6 °F (36.4 °C)-97.8 °F (36.6 °C)]   Pulse:  []   Resp:  [12-23]   BP: (140-194)/()   SpO2:  [94 %-100 %]     Physical Exam   Constitutional: He is oriented to person, place, and time. He appears well-developed and well-nourished. No distress.   HENT:   Head: Normocephalic and atraumatic.   Eyes: EOM are normal. Pupils are equal, round, and reactive to light.   Neck: Normal range of motion.   Cardiovascular: Normal rate.    Pulmonary/Chest: Effort normal.   Abdominal: Soft.   Musculoskeletal: Normal range of motion.   Neurological: He is alert and oriented to person, place, and time.   Skin: Skin is  warm and dry. He is not diaphoretic.   Nursing note and vitals reviewed.      Neurological Exam:   LOC: alert  Attention Span: Good   Language: No aphasia  Articulation: No dysarthria  Orientation: Person, Place, Time   Visual Fields: Full  EOM (CN III, IV, VI): Full/intact  Pupils (CN II, III): PERRL  Facial Sensation (CN V): Normal  Facial Movement (CN VII): Symmetric facial expression    Gag Reflex: present  Reflexes: flexor plantar responses bilaterally  Motor: Normal 5/5 all 4  Cebellar: No evidence of appendicular or axial ataxia  Sensation: Intact to light touch, temperature and vibration  Tone: Normal tone throughout      Laboratory:  CMP:   Recent Labs  Lab 12/24/17  0055   CALCIUM 9.9   ALBUMIN 4.1   PROT 8.0      K 3.2*   CO2 24      BUN 15   CREATININE 0.98   ALKPHOS 75   ALT 30   AST 15*   BILITOT 0.4     CBC:   Recent Labs  Lab 12/24/17  0055   WBC 16.51*   RBC 4.50*   HGB 13.8*   HCT 39.7*   *   MCV 88   MCH 30.7   MCHC 34.8     Lipid Panel: No results for input(s): CHOL, LDLCALC, HDL, TRIG in the last 168 hours.  Coagulation:   Recent Labs  Lab 12/24/17  0055   INR 1.0     Hgb A1C: No results for input(s): HGBA1C in the last 168 hours.  TSH: No results for input(s): TSH in the last 168 hours.    Diagnostic Results:      Brain imaging:      Vessel Imaging:      Cardiac Evaluation:   EKG 12-23-17 unconfirmed results:  Sinus tachycardia  Possible Inferior infarct ,age undetermined  Anterior infarct ,age undetermined        Tamica Snyder NP  [facility]  Department of Vascular Neurology   [facility]

## 2017-12-24 NOTE — SUBJECTIVE & OBJECTIVE
Past Medical History:   Diagnosis Date    Coronary artery disease     Diabetes mellitus     Hypertension      Past Surgical History:   Procedure Laterality Date    CORONARY STENT PLACEMENT      x9      Current Facility-Administered Medications on File Prior to Encounter   Medication Dose Route Frequency Provider Last Rate Last Dose    [COMPLETED] alteplase (ACTIVASE) 100 mg injection 69 mg  0.81 mg/kg Intravenous ED 1 Time Ida Lunsford MD 69 mL/hr at 12/24/17 0137 69 mg at 12/24/17 0137    [COMPLETED] ALTEPLASE IV BOLUS bolus from vial 8 mg  0.09 mg/kg Intravenous ED 1 Time Ida Lunsford MD   Stopped at 12/24/17 0137    [COMPLETED] ondansetron injection 4 mg  4 mg Intravenous ED 1 Time Ida Lunsford MD   4 mg at 12/24/17 0128    [COMPLETED] ondansetron injection 4 mg  4 mg Intravenous ED 1 Time Ida Lunsford MD   4 mg at 12/24/17 0213    [DISCONTINUED] niCARdipine 40 mg/200 mL infusion  2.5 mg/hr Intravenous Continuous Ida Lunsford MD 12.5 mL/hr at 12/24/17 0139 2.5 mg/hr at 12/24/17 0139     Current Outpatient Prescriptions on File Prior to Encounter   Medication Sig Dispense Refill    acetaminophen-codeine 300-30mg (TYLENOL #3) 300-30 mg Tab Take 1 tablet by mouth as needed.      blood sugar diagnostic Strp 1 each by Misc.(Non-Drug; Combo Route) route 2 (two) times daily before meals. For True Metrix meter 100 each 3    fluconazole (DIFLUCAN) 100 MG tablet Take 1 tablet (100 mg total) by mouth once daily. 7 tablet 0    insulin glargine (LANTUS SOLOSTAR) 100 unit/mL (3 mL) InPn pen Inject 40 Units into the skin every evening. (Patient taking differently: Inject 50 Units into the skin every evening. ) 12 mL 1    lancets Misc 1 each by Misc.(Non-Drug; Combo Route) route 2 (two) times daily before meals. For True Metrix meter 100 each 3    lidocaine HCL 4% (XYLOCAINE) 4 % (40 mg/mL) external solution Apply 4 mLs topically daily as needed. Dressing  changes 50 mL 0    lisinopril (PRINIVIL,ZESTRIL) 20 MG tablet Take 1 tablet (20 mg total) by mouth once daily. 30 tablet 1    nicotine (NICODERM CQ) 21 mg/24 hr Place 1 patch onto the skin once daily. 30 patch 0    pregabalin (LYRICA) 300 MG Cap Take 300 mg by mouth 2 (two) times daily.      simvastatin (ZOCOR) 40 MG tablet Take 1 tablet (40 mg total) by mouth every evening. 30 tablet 11      Allergies: Pcn [penicillins]    No family history on file.  Social History   Substance Use Topics    Smoking status: Current Every Day Smoker     Packs/day: 0.50     Types: Cigarettes    Smokeless tobacco: Never Used    Alcohol use No     Review of Systems     Review of symptoms  Constitutional: Denies fevers or chills. Stated L eye blindness  Pulmonary: Denies shortness of breath or cough.  Cardiology: Denies chest pain or palpitations.  GI: Denies abdominal pain or constipation.  Neurologic: Denies headache, complained of numbness sarah. lower extremities d/t neuropathy.   Objective:     Vitals:  Temp: 97.6 °F (36.4 °C) (12/24/17 0309)  Pulse: (!) 120 (12/24/17 0406)  Resp: 19 (12/24/17 0406)  BP: 137/63 (12/24/17 0406)  SpO2: 100 % (12/24/17 0406)    Temp:  [97.6 °F (36.4 °C)-97.8 °F (36.6 °C)] 97.6 °F (36.4 °C)  Pulse:  [] 120  Resp:  [12-23] 19  SpO2:  [94 %-100 %] 100 %  BP: (137-194)/() 137/63              No intake/output data recorded.    Physical Exam    Physical Exam:  GA: Alert, comfortable, no acute distress.   HEENT: No scleral icterus or JVD.   Pulmonary: Clear to auscultation A. No wheezing, crackles, or rhonchi.  Cardiac: RRR S1 & S2 w/o rubs/murmurs/gallops.   Abdominal: Bowel sounds present x 4. No appreciable hepatosplenomegaly.  Skin: No jaundice, rashes, or visible lesions.  Neuro:  --GCS: E4 V5 M6  --Mental Status:  Awake,   --CN II-XII grossly intact.   --Pupils 3mm, PERRL.   --Corneal reflex, gag, cough intact.  --LUE strength: 5/5  --RUE strength: 5/5  --LLE strength: 5/5  --RLE  strength: 5/5    Today I personally reviewed pertinent medications, lines/drains/airways, imaging, lab results,

## 2017-12-24 NOTE — PROGRESS NOTES
Patient arrived to [facility] from [facility] >> ED>> [facility]    Type of Stroke ischemic    TPA start time 0136 end time 0236    Patients current symptoms include R side numbness

## 2017-12-24 NOTE — PLAN OF CARE
Problem: Patient Care Overview  Goal: Plan of Care Review  Outcome: Ongoing (interventions implemented as appropriate)  Recommendations  1. Encourage increased PO intake as tolerated.   2. If PO intake decreases, recommend adding Boost Glucose Control OS to all meals.   3. Noted patient with HgbA1c = 11.8, however noted significant decrease from 12/8/17 HgbA1c = 13.6. Per chart review, 12/8/17 RD/CDE provided education and spoke with patient's wife regarding outpatient diabetes education program.   RD to monitor.    Goals: Patient to consume > 75% of meals  Nutrition Goal Status: new    Full assessment completed, see RD Consult Note 12/24/17.

## 2017-12-24 NOTE — ED NOTES
Pt is asleep and responds to voice. Respirations are spontaneous with normal rate and effort. Pt with no complaints at this time.

## 2017-12-24 NOTE — ED NOTES
TPA Administration   Administered by  [facility]    Bolus dose of  8 mg given at 0136   Infusion of 69 mg started at 0137   Infusion completed at 0236   Last Seen Normal: 12/23 at 2354

## 2017-12-25 VITALS
RESPIRATION RATE: 23 BRPM | HEART RATE: 101 BPM | WEIGHT: 187.38 LBS | SYSTOLIC BLOOD PRESSURE: 108 MMHG | TEMPERATURE: 98 F | OXYGEN SATURATION: 98 % | DIASTOLIC BLOOD PRESSURE: 75 MMHG | HEIGHT: 68 IN | BODY MASS INDEX: 28.4 KG/M2

## 2017-12-25 PROBLEM — I63.441 EMBOLIC STROKE INVOLVING RIGHT CEREBELLAR ARTERY: Status: ACTIVE | Noted: 2017-12-25

## 2017-12-25 PROBLEM — G93.6 CYTOTOXIC CEREBRAL EDEMA: Status: ACTIVE | Noted: 2017-12-25

## 2017-12-25 LAB
ALBUMIN SERPL BCP-MCNC: 2.9 G/DL
ALP SERPL-CCNC: 63 U/L
ALT SERPL W/O P-5'-P-CCNC: 13 U/L
ANION GAP SERPL CALC-SCNC: 8 MMOL/L
AST SERPL-CCNC: 28 U/L
BASOPHILS # BLD AUTO: 0.02 K/UL
BASOPHILS NFR BLD: 0.2 %
BILIRUB SERPL-MCNC: 0.3 MG/DL
BUN SERPL-MCNC: 21 MG/DL
CALCIUM SERPL-MCNC: 9.1 MG/DL
CHLORIDE SERPL-SCNC: 107 MMOL/L
CO2 SERPL-SCNC: 21 MMOL/L
CREAT SERPL-MCNC: 1.1 MG/DL
DIFFERENTIAL METHOD: ABNORMAL
EOSINOPHIL # BLD AUTO: 0.2 K/UL
EOSINOPHIL NFR BLD: 1.8 %
ERYTHROCYTE [DISTWIDTH] IN BLOOD BY AUTOMATED COUNT: 12.6 %
EST. GFR  (AFRICAN AMERICAN): >60 ML/MIN/1.73 M^2
EST. GFR  (NON AFRICAN AMERICAN): >60 ML/MIN/1.73 M^2
GLUCOSE SERPL-MCNC: 154 MG/DL
HCT VFR BLD AUTO: 36.6 %
HGB BLD-MCNC: 11.9 G/DL
IMM GRANULOCYTES # BLD AUTO: 0.03 K/UL
IMM GRANULOCYTES NFR BLD AUTO: 0.3 %
LYMPHOCYTES # BLD AUTO: 3.7 K/UL
LYMPHOCYTES NFR BLD: 36.4 %
MAGNESIUM SERPL-MCNC: 1.8 MG/DL
MCH RBC QN AUTO: 29.3 PG
MCHC RBC AUTO-ENTMCNC: 32.5 G/DL
MCV RBC AUTO: 90 FL
MONOCYTES # BLD AUTO: 1.1 K/UL
MONOCYTES NFR BLD: 11 %
NEUTROPHILS # BLD AUTO: 5.1 K/UL
NEUTROPHILS NFR BLD: 50.3 %
NRBC BLD-RTO: 0 /100 WBC
PHOSPHATE SERPL-MCNC: 3.8 MG/DL
PLATELET # BLD AUTO: 298 K/UL
PMV BLD AUTO: 9.8 FL
POCT GLUCOSE: 142 MG/DL (ref 70–110)
POCT GLUCOSE: 190 MG/DL (ref 70–110)
POCT GLUCOSE: 192 MG/DL (ref 70–110)
POCT GLUCOSE: 201 MG/DL (ref 70–110)
POTASSIUM SERPL-SCNC: 4.4 MMOL/L
PROT SERPL-MCNC: 6.5 G/DL
RBC # BLD AUTO: 4.06 M/UL
SODIUM SERPL-SCNC: 136 MMOL/L
WBC # BLD AUTO: 10.2 K/UL

## 2017-12-25 PROCEDURE — 63600175 PHARM REV CODE 636 W HCPCS: Performed by: NURSE PRACTITIONER

## 2017-12-25 PROCEDURE — G8980 MOBILITY D/C STATUS: HCPCS | Mod: CH

## 2017-12-25 PROCEDURE — 25000003 PHARM REV CODE 250: Performed by: NURSE PRACTITIONER

## 2017-12-25 PROCEDURE — 99232 SBSQ HOSP IP/OBS MODERATE 35: CPT | Mod: ,,, | Performed by: NURSE PRACTITIONER

## 2017-12-25 PROCEDURE — 83735 ASSAY OF MAGNESIUM: CPT

## 2017-12-25 PROCEDURE — 85025 COMPLETE CBC W/AUTO DIFF WBC: CPT

## 2017-12-25 PROCEDURE — G8978 MOBILITY CURRENT STATUS: HCPCS | Mod: CH

## 2017-12-25 PROCEDURE — 94761 N-INVAS EAR/PLS OXIMETRY MLT: CPT

## 2017-12-25 PROCEDURE — 87205 SMEAR GRAM STAIN: CPT

## 2017-12-25 PROCEDURE — 87070 CULTURE OTHR SPECIMN AEROBIC: CPT

## 2017-12-25 PROCEDURE — 99233 SBSQ HOSP IP/OBS HIGH 50: CPT | Mod: GC,,, | Performed by: PSYCHIATRY & NEUROLOGY

## 2017-12-25 PROCEDURE — A4216 STERILE WATER/SALINE, 10 ML: HCPCS | Performed by: NURSE PRACTITIONER

## 2017-12-25 PROCEDURE — S4991 NICOTINE PATCH NONLEGEND: HCPCS | Performed by: NURSE PRACTITIONER

## 2017-12-25 PROCEDURE — G8979 MOBILITY GOAL STATUS: HCPCS | Mod: CH

## 2017-12-25 PROCEDURE — 80053 COMPREHEN METABOLIC PANEL: CPT

## 2017-12-25 PROCEDURE — 97161 PT EVAL LOW COMPLEX 20 MIN: CPT

## 2017-12-25 PROCEDURE — 84100 ASSAY OF PHOSPHORUS: CPT

## 2017-12-25 PROCEDURE — 97165 OT EVAL LOW COMPLEX 30 MIN: CPT

## 2017-12-25 RX ORDER — FLUCONAZOLE 100 MG/1
100 TABLET ORAL DAILY
Status: DISCONTINUED | OUTPATIENT
Start: 2017-12-25 | End: 2017-12-25 | Stop reason: HOSPADM

## 2017-12-25 RX ORDER — HEPARIN SODIUM 5000 [USP'U]/ML
5000 INJECTION, SOLUTION INTRAVENOUS; SUBCUTANEOUS EVERY 8 HOURS
Status: DISCONTINUED | OUTPATIENT
Start: 2017-12-25 | End: 2017-12-25 | Stop reason: HOSPADM

## 2017-12-25 RX ORDER — ATORVASTATIN CALCIUM 40 MG/1
40 TABLET, FILM COATED ORAL DAILY
Qty: 90 TABLET | Refills: 3 | Status: ON HOLD | OUTPATIENT
Start: 2017-12-26 | End: 2018-11-11 | Stop reason: SDUPTHER

## 2017-12-25 RX ORDER — PREGABALIN 75 MG/1
300 CAPSULE ORAL 2 TIMES DAILY
Status: DISCONTINUED | OUTPATIENT
Start: 2017-12-25 | End: 2017-12-25 | Stop reason: HOSPADM

## 2017-12-25 RX ORDER — ACETAMINOPHEN AND CODEINE PHOSPHATE 300; 30 MG/1; MG/1
1 TABLET ORAL EVERY 6 HOURS PRN
Status: DISCONTINUED | OUTPATIENT
Start: 2017-12-25 | End: 2017-12-25 | Stop reason: HOSPADM

## 2017-12-25 RX ORDER — NAPROXEN SODIUM 220 MG/1
81 TABLET, FILM COATED ORAL DAILY
Status: DISCONTINUED | OUTPATIENT
Start: 2017-12-25 | End: 2017-12-25 | Stop reason: HOSPADM

## 2017-12-25 RX ORDER — SULFAMETHOXAZOLE AND TRIMETHOPRIM 800; 160 MG/1; MG/1
1 TABLET ORAL 2 TIMES DAILY
Qty: 19 TABLET | Refills: 0 | Status: SHIPPED | OUTPATIENT
Start: 2017-12-25 | End: 2018-11-09

## 2017-12-25 RX ORDER — SULFAMETHOXAZOLE AND TRIMETHOPRIM 800; 160 MG/1; MG/1
1 TABLET ORAL 2 TIMES DAILY
Status: DISCONTINUED | OUTPATIENT
Start: 2017-12-25 | End: 2017-12-25 | Stop reason: HOSPADM

## 2017-12-25 RX ORDER — NAPROXEN SODIUM 220 MG/1
81 TABLET, FILM COATED ORAL DAILY
Refills: 0 | COMMUNITY
Start: 2017-12-26 | End: 2018-11-09

## 2017-12-25 RX ADMIN — ATORVASTATIN CALCIUM 40 MG: 20 TABLET, FILM COATED ORAL at 08:12

## 2017-12-25 RX ADMIN — INSULIN ASPART 2 UNITS: 100 INJECTION, SOLUTION INTRAVENOUS; SUBCUTANEOUS at 12:12

## 2017-12-25 RX ADMIN — SODIUM CHLORIDE, PRESERVATIVE FREE 3 ML: 5 INJECTION INTRAVENOUS at 05:12

## 2017-12-25 RX ADMIN — SULFAMETHOXAZOLE AND TRIMETHOPRIM 1 TABLET: 800; 160 TABLET ORAL at 09:12

## 2017-12-25 RX ADMIN — ASPIRIN 81 MG CHEWABLE TABLET 81 MG: 81 TABLET CHEWABLE at 05:12

## 2017-12-25 RX ADMIN — HEPARIN SODIUM 5000 UNITS: 5000 INJECTION, SOLUTION INTRAVENOUS; SUBCUTANEOUS at 05:12

## 2017-12-25 RX ADMIN — LISINOPRIL 20 MG: 20 TABLET ORAL at 08:12

## 2017-12-25 RX ADMIN — INSULIN ASPART 2 UNITS: 100 INJECTION, SOLUTION INTRAVENOUS; SUBCUTANEOUS at 11:12

## 2017-12-25 RX ADMIN — NICOTINE 1 PATCH: 21 PATCH, EXTENDED RELEASE TRANSDERMAL at 08:12

## 2017-12-25 RX ADMIN — PREGABALIN 300 MG: 75 CAPSULE ORAL at 09:12

## 2017-12-25 RX ADMIN — FLUCONAZOLE 100 MG: 100 TABLET ORAL at 11:12

## 2017-12-25 RX ADMIN — ACETAMINOPHEN AND CODEINE PHOSPHATE 1 TABLET: 300; 30 TABLET ORAL at 09:12

## 2017-12-25 NOTE — PLAN OF CARE
Problem: Physical Therapy Goal  Goal: Physical Therapy Goal  Goals to be met by: 12/29/2017    Patient will increase functional independence with mobility by performing:    Bed to chair transfer via Stand Pivot with Modified Independent using Straight Cane.  Gait  x 150 feet with Modified [place] using Straight Cane.    Dynamic standing for 10 minutes with Modified [place] using Straight Cane.  Able to tolerate exercise for 15-20 reps with independence.  Patient and family able to teachback stroke & positioning education independently.    Outcome: Ongoing (interventions implemented as appropriate)    PT eval completed  Appropriate to transfer with: RN/PCT, CGA of 1 person  Becky Sheffield PT, DPT  12/25/2017  Pager: [phone]

## 2017-12-25 NOTE — PT/OT/SLP EVAL
"Physical Therapy Evaluation    Patient Name:  Ismael Gibson   MRN:  [id number]  Admitting Diagnosis:  S/P admn tPA in diff fac w/n last 24 hr bef adm to [facility] fac   Recent Surgery: * No surgery found *      Recommendations:     Discharge Recommendations:  home   Discharge Equipment Recommendations: none   Barriers to discharge: None    Plan:     During this hospitalization, patient to be seen 2 x/week to address the above listed problems via gait training, therapeutic activities, therapeutic exercises, neuromuscular re-education  · Plan of Care Expires:  1/24/18   Plan of Care Reviewed with: patient, spouse    This Plan of care has been discussed with the patient who was involved in its development and understands and is in agreement with the identified goals and treatment plan    History:     Patient lives with their spouse in a two story home no CHAN. Pt reports not going to second floor.       Bedroom:  full access   Bathroom:  full access    Patient reports being independent with feeding and dressing, wife assists with bathing due to UE ROM limitation.  Patient uses DME as follows:[employer]  Patient owns following equipment but does not use: none   Work: no, disability.   Drive: yes.   Managing Medicines/Managing Home: yes.   Hand dominance: right.   Wears glasses: yes  Hobbies: working on his [employer].  Upon discharge, patient will have assistance from wife and family.    Past Medical History:   Diagnosis Date    Coronary artery disease     Diabetes mellitus     Hypertension        Past Surgical History:   Procedure Laterality Date    CORONARY STENT PLACEMENT      x9       Subjective     Communicated with RN prior to session.  Patient found supine upon PT entry to room, agreeable to evaluation.    "This bed is so uncomfortable, my groin is hurting and they're not giving me anything to make it better. I'm not staying another night here."  Chief Complaint: right sided numbness, resolved  Patient comments/goals: to " "go home.  Pain/Comfort:  · Pain Rating 1: 8/10  · Location - Side 1: Right  · Location - Orientation 1: medial  · Location 1: groin  · Pain Addressed 1: Distraction, Cessation of Activity, Nurse notified  · Pain Rating Post-Intervention 1: 8/10    Objective:     Patient found with: pulse ox (continuous), telemetry, blood pressure cuff     General Precautions: Standard, Cardiac fall, aspiration   Orthopedic Precautions:N/A   Braces: N/A     Exam:    · Mental Status: Patient is oriented to Person, Place, Time and Situation and follows 100% of verbal commands. Alert  · Skin: Visible skin intact  · Edema: none noted  · Sensation: Intact  · Posture: forward head  · Hearing: Intact  · Vision:  Intact  · Coordination:  Intact  Left hand, finger to nose and Right hand, finger to nose    · Range of Motion:  · RUE: WFL  · LUE: WFL  · RLE: WFL  · LLE: WFL      Lower Extremity Strength  (R) LE  (L) UE    Hip Flexion: 5/5 Hip flexion: 5/5   Knee flexion: 5/5 Knee flexion: 5/5   Knee extension: 5/5 Knee extension 5/5   Ankle dorsiflexion: 5/5 Ankle dorsiflexion: 5/5   Ankle plantarflexion: 5/5 Ankle plantarflexion: 5/5     Functional Mobility:  Bed Mobility:     · Patient completed Supine to Sit with independence from right side of bed  · Patient completed Sit to Supine with independence to right side of bed    Transfers:   · Patient completed Sit <> Stand Transfer with stand by assistance  with  no assistive device   · Patient completed Stand <> Sit Transfer with stand by assistance  with  no assistive device       Gait:  · Patient ambulated: 15ft within room   · Patient required: standy by assistance  · Patient used:  No Assistive Device  · Gait Pattern observed: 2-point gait  · Gait Deviation(s): decreased myla flexed posturing  · Impairments due to: pain of RLE, caution for falling    Therapeutic Activities & Exercises:   Patient & family educated on signs & symptoms of stroke, including the "FAST" acronym and risk factor " modification. Pt & family able to demonstrate understanding of all education via the teachback method.    Education:  Patient provided with daily orientation and goals of this PT session. Patient and family agreed to participate in session. Patient and family aware of patient's deficits and therapy progression. They were educated to transfer with RN/PCT only; CGA of 1 person. Time provided for therapeutic counseling and discussion of health disposition. All questions answered to patient's and family's satisfaction, within scope of PT practice; voiced no other concerns. White board updated in patient's room, RN notified of session.    Patient left supine, with head in midline, neutral pelvis & heels floated for skin protection with all lines intact, call button in reach, RN notified and wife present    AM-PAC 6 CLICK MOBILITY  Total Score:24     Assessment:     Ismael Gibson is a 52 y.o. male admitted with a medical diagnosis of S/P admn tPA in [facility] w/n last 24 hr bef adm to [facility].  He presents with the following impairments/functional limitations:  gait instability, impaired balance. Ismael Gibson's deficits effect their ability to safely and independently participate in self-care tasks and functional mobility which increases their caregiver burden. Due to his physical therapy diagnosis of debility and deconditioning, they continue to benefit from acute PT services to address the following limitations: high fall risk, weakness, instability, the need for supervised instruction of exercise. Ismael Ackermans deficits effect their roles and responsibilities in which they were able to complete prior to admit. Education was provided to patient & family regarding importance of continued participation in therapy, patient's progress and discharge disposition.     Rehab Prognosis: good; patient would benefit from acute skilled PT services to address these deficits and reach maximum level of function.       GOALS:    Physical Therapy Goals        Problem: Physical Therapy Goal    Goal Priority Disciplines Outcome Goal Variances Interventions   Physical Therapy Goal     PT/OT, PT Ongoing (interventions implemented as appropriate)     Description:  Goals to be met by: 12/29/2017    Patient will increase functional independence with mobility by performing:    Bed to chair transfer via Stand Pivot with Modified Independent using Straight Cane.  Gait  x 150 feet with Modified [place] using Straight Cane.    Dynamic standing for 10 minutes with Modified [place] using Straight Cane.  Able to tolerate exercise for 15-20 reps with independence.  Patient and family able to teachback stroke & positioning education independently.                       Clinical Decision Making:   HISTORY: Co-morbidities and personal factors that may impact the plan of care  Co-morbidities:   [] Time since onset of injury / illness / exacerbation [] Status of current condition []Patient's cognitive status and safety concerns  [] Multiple Medical Problems  Personal Factors:  [] Patient's age [] Prior Level of function [] Patient's home situation (environment and family support) [] Patient's level of motivation [] Expected progression of patient  EXAMINATION: Body Structures and Functions, activity limitations and participation restrictions that may impact the plan of care  Body Regions:  [] Head [] Neck  [] Trunk [] Upper Extremity [x] Lower Extremity  Body Systems:  [] For communication ability, affect, cognition, language, and learning style: the assessment of the ability to make needs known, consciousness, orientation, expected emotional /behavioral responses, and learning preferences  [x] For the neuromuscular system: a general assessment of gross coordinated movement (eg, balance, gait, locomotion, transfers, and transitions) and motor function (motor control and motor learning)  [] For the musculoskeletal system: the assessment of  gross symmetry, gross range of motion, gross strength, height, and weight  [] For the integumentary system: the assessment of pliability(texture), presence of scar formation, skin color, and skin integrity  [] For cardiovascular/pulmonary system: the assessment of heart rate, respiratory rate, blood pressure, and edema   Activity limitations:   [] Patient's cognitive status and safety concerns [] Status of current condition      [] Weight bearing restriction [] Cardiopulmunary Restriction  Participation Restrictions:   [] Goals and goal agreement with the patient  [] Rehab potential (prognosis) and probable outcome    CLINICAL PRESENTATION:  [x] stable [] evolving [] unstable    On examination of body system using standardized tests and measures patient presents with 1-2 elements from any of the following: body structures and functions, activity limitations, and/or participation restrictions.  Leading to a clinical presentation that is considered stable and/or uncomplicated  Evaluation Complexity:  Low- 76868      Time Tracking:     PT Received On: 12/25/17  PT Start Time: 0807     PT Stop Time: 0830  PT Total Time (min): 23 min     Billable Minutes: Evaluation 23    Becky Sheffield PT, DPT  12/25/2017  Pager:[phone]

## 2017-12-25 NOTE — SUBJECTIVE & OBJECTIVE
Interval History:    -Exam Stable  -Discharge vs stepdown pending vascular neurology eval      Review of Systems   Respiratory: Negative for apnea, cough, choking, chest tightness, shortness of breath, wheezing and stridor.    Cardiovascular: Negative for chest pain, palpitations and leg swelling.   Gastrointestinal: Negative for abdominal distention, abdominal pain, nausea and vomiting.       Objective:     Vitals:  Temp: 98 °F (36.7 °C) (12/25/17 1102)  Pulse: 101 (12/25/17 1302)  Resp: (!) 23 (12/25/17 1302)  BP: 108/75 (12/25/17 1302)  SpO2: 98 % (12/25/17 1302)    Temp:  [98 °F (36.7 °C)-98.5 °F (36.9 °C)] 98 °F (36.7 °C)  Pulse:  [] 101  Resp:  [15-34] 23  SpO2:  [93 %-100 %] 98 %  BP: ()/(55-89) 108/75              12/24 0701 - 12/25 0700  In: 506 [P.O.:500; I.V.:6]  Out: 600 [Urine:600]    Physical Exam   Constitutional: He appears well-developed.   Cardiovascular: Normal rate, regular rhythm, normal heart sounds and intact distal pulses.    Pulmonary/Chest: Effort normal and breath sounds normal.   Abdominal: Soft. Bowel sounds are normal.   Neurological:   E4 V5 M6  AAo x 4, converse  PERRLA, 3mm/3mm  ESTRADA, follows commands  LUE 5/5  RUE 5/5  LLE 5/5  RLE 5/5   Skin: Skin is warm.   Vitals reviewed.        Medications:  Continuous  niCARdipine   Scheduled  aspirin 81 mg Daily   atorvastatin 40 mg Daily   fluconazole 100 mg Daily   heparin (porcine) 5,000 Units Q8H   lisinopril 20 mg Daily   nicotine 1 patch Daily   pregabalin 300 mg BID   sodium chloride 0.9% 3 mL Q8H   sulfamethoxazole-trimethoprim 800-160mg 1 tablet BID   PRN  acetaminophen 650 mg Q6H PRN   acetaminophen-codeine 300-30mg 1 tablet Q6H PRN   dextrose 50% 12.5 g PRN   glucagon (human recombinant) 1 mg PRN   insulin aspart 1-10 Units Q6H PRN   labetalol 10 mg Q4H PRN   niCARdipine 2.5 mg/hr Continuous PRN   ondansetron 4 mg Q8H PRN     Today I personally reviewed pertinent medications, lines/drains/airways, imaging, cardiology,  lab results, microbiology results,

## 2017-12-25 NOTE — NURSING
Pt transported to and from MRI for scheduled scan of head and neck. VSS throughout transport. Pt transported via wheelchair with cardiac monitoring by myself. Returned safely to room at 0028.

## 2017-12-25 NOTE — PT/OT/SLP EVAL
Occupational Therapy   Evaluation/d/c    Name: Ismael Gibson  MRN: [id number]  Admitting Diagnosis:  S/P admn tPA in diff fac w/n last 24 hr bef adm to crnt fac      Recommendations:     Discharge Recommendations: home  Discharge Equipment Recommendations:  none  Barriers to discharge:  None    History:     Occupational Profile:  Living Environment: Pt. Resides in SS house with spouse who is available to assist  Previous level of function: independent with mobility and ADL tasks but has required assist for putting on socks since recent groin sx.  Also cannot bathe since recent sx (hasn't been cleared to as of yet)    Roles and Routines:  , father , brother   Equipment Owned:  cane, straight (doesn't use it often though)  Assistance upon Discharge: wife is available to assist    Past Medical History:   Diagnosis Date    Coronary artery disease     Diabetes mellitus     Hypertension        Past Surgical History:   Procedure Laterality Date    CORONARY STENT PLACEMENT      x9       Subjective     Chief Complaint: Wanting to go home / discomfort in groin  Patient/Family stated goals: To get to go home   Communicated with: PT (who communicated with nursing) prior to session.  Pain/Comfort:  · Pain Rating 1:  (minimal discomfurt from recent groin sx did not qunatify)  · Location - Side 1: Right  · Location 1: groin  · Pain Addressed 1: Distraction  · Pain Rating Post-Intervention 1:  (no increases in pain noted )    Objective:     Patient found with: blood pressure cuff, telemetry, pulse ox (continuous) (supine in bed with family present)    General Precautions: Standard, aspiration, fall (cardiac )   Orthopedic Precautions:N/A   Braces: N/A     Occupational Performance:    Bed Mobility:    · Patient completed Rolling/Turning to Right with independence  · Patient completed Scooting/Bridging with independence  · Patient completed Supine to Sit with independence  · Patient completed Sit to Supine with  independence    Functional Mobility/Transfers:  Patient completed Sit <> Stand Transfer with independence  with  no assistive device    Marching in place with independence and no AD  Activities of Daily Living:  · UB Dressing: Set Up A to don gown like robe seated OB  · LB Dressing:  Set Up A to don sweat pants seated EOB (socks deferred 2/2 to pt. Not doing since recent groin sx )    Cognitive/Visual Perceptual:  Cognitive/Psychosocial Skills:     -       Oriented to: Person, Place, Time and Situation   -       Follows Commands/attention:Follows multistep  commands  -       Communication: clear/fluent  -       Memory: No Deficits noted  -       Safety awareness/insight to disability: intact   -       Mood/Affect/Coping skills/emotional control: Appropriate to situation  Visual/Perceptual:  Reports wearing glasses/ no difficulty noted tracking     Physical Exam:  Balance:  Pt. With good static and dynamic sitting and standing balance   Postural examination/scapula alignment:    Skin integrity: Visible skin intact but has had recent groin sx   Sensation:    -       Intact in BUE  Dominant hand: right  Upper Extremity Range of Motion:     -       Right Upper Extremity: WNL  -       Left Upper Extremity: WNL  Upper Extremity Strength:    -       Right Upper Extremity: WNL   Strength:    -       Left Upper Extremity: WNL  Fine Motor Coordination:    -       Intact  Gross motor coordination:   WFL  Neurological: appears intact    Patient left supine with all lines intact, call button in reach and family present    AMPA 6 Click:  AMPA Total Score: 24    Treatment & Education:  Pt. Educated on role of OT   pt. And pt. Family provided with education on s/s of stroke and recommendations for safety   Education:    Assessment:     Ismael LATIA Jr Gibson is a 52 y.o. male with a medical diagnosis of S/P admn tPA in diff fac w/n last 24 hr bef adm to crnt fac.  He presents with baseline level of functioning with ADL tasks and  "does not require continued OT services at this time.     Clinical Decision Makin.  OT Low:  "Pt evaluation falls under low complexity for evaluation coding due to performance deficits noted in 1 area but only appears ddue to recent groin sx  as stated above and 0 co-morbities affecting current functional status. Data obtained from problem focused assessments. No modifications or assistance was required for completion of evaluation. Only brief occupational profile and history review completed."     Plan:     Patient to be seen   to address the above listed problems via    · Plan of Care Expires:    · Plan of Care Reviewed with: patient, spouse    This Plan of care has been discussed with the patient who was involved in its development and understands and is in agreement with the identified goals and treatment plan    GOALS:   No goals set on this date  Time Tracking:     OT Date of Treatment: 17  OT Start Time: 858  OT Stop Time: 908  OT Total Time (min): 10 min    Billable Minutes:Evaluation 10    MACARENA Shaffer  2017    "

## 2017-12-25 NOTE — PROGRESS NOTES
[facility]  Neurocritical Care  Progress Note    Admit Date: 12/24/2017  Service Date: 12/25/2017  Length of Stay: 1    Subjective:     Chief Complaint: S/P admn tPA in diff fac w/n last 24 hr bef adm to crnt fac    History of Present Illness: Mr. Gibson is a 52 year old M with PMHx of CAD, HTN, DM and tobacco abuse presenting to [facility] s/p tPA for R sided numbness. Per patient's  wife, Mr. Gibson had a sudden onset of right-sided numbness which started approximately 11:54 PM (12/23), had 1 episode of emesis,and complained of dizziness and headache prior to presenting to outside facility. Patient stated that at baseline has some numbness in his lower extremities due to neuropathy, however the numbness experienced on the R side of his body  was different. He also complained of not having control or strength in right side of his body however his symptoms  has resolved s/p tPA. Patient admitted to [facility] for higher level of care.         Hospital Course: 12/24: Patient admitted to [facility] s/p tPA   12/25: Discharge vs stepdown pending completion of work-up    Interval History:    -Exam Stable  -Discharge vs stepdown pending vascular neurology eval      Review of Systems   Respiratory: Negative for apnea, cough, choking, chest tightness, shortness of breath, wheezing and stridor.    Cardiovascular: Negative for chest pain, palpitations and leg swelling.   Gastrointestinal: Negative for abdominal distention, abdominal pain, nausea and vomiting.       Objective:     Vitals:  Temp: 98 °F (36.7 °C) (12/25/17 1102)  Pulse: 101 (12/25/17 1302)  Resp: (!) 23 (12/25/17 1302)  BP: 108/75 (12/25/17 1302)  SpO2: 98 % (12/25/17 1302)    Temp:  [98 °F (36.7 °C)-98.5 °F (36.9 °C)] 98 °F (36.7 °C)  Pulse:  [] 101  Resp:  [15-34] 23  SpO2:  [93 %-100 %] 98 %  BP: ()/(55-89) 108/75              12/24 0701 - 12/25 0700  In: 506 [P.O.:500; I.V.:6]  Out: 600 [Urine:600]    Physical Exam   Constitutional: He appears  well-developed.   Cardiovascular: Normal rate, regular rhythm, normal heart sounds and intact distal pulses.    Pulmonary/Chest: Effort normal and breath sounds normal.   Abdominal: Soft. Bowel sounds are normal.   Neurological:   E4 V5 M6  AAo x 4, converse  PERRLA, 3mm/3mm  ESTRADA, follows commands  LUE 5/5  RUE 5/5  LLE 5/5  RLE 5/5   Skin: Skin is warm.   Vitals reviewed.        Medications:  Continuous  niCARdipine   Scheduled  aspirin 81 mg Daily   atorvastatin 40 mg Daily   fluconazole 100 mg Daily   heparin (porcine) 5,000 Units Q8H   lisinopril 20 mg Daily   nicotine 1 patch Daily   pregabalin 300 mg BID   sodium chloride 0.9% 3 mL Q8H   sulfamethoxazole-trimethoprim 800-160mg 1 tablet BID   PRN  acetaminophen 650 mg Q6H PRN   acetaminophen-codeine 300-30mg 1 tablet Q6H PRN   dextrose 50% 12.5 g PRN   glucagon (human recombinant) 1 mg PRN   insulin aspart 1-10 Units Q6H PRN   labetalol 10 mg Q4H PRN   niCARdipine 2.5 mg/hr Continuous PRN   ondansetron 4 mg Q8H PRN     Today I personally reviewed pertinent medications, lines/drains/airways, imaging, cardiology, lab results, microbiology results,    Assessment/Plan:     Cardiac/Vascular   Hyperlipidemia    Atorvastatin 40 daily         Hypertension    SBP< 180  Continue home dose Lisinopril  Cardene gtt  PRN Labetalol  Echo to be done outpatient        Renal/   Scrotal abscess    recently in the hospital for perineal abscess which was I&D per wife  Discussed abx with patient and spouse  Bactrim PO resumed and Diflucan resumed. They are to f/u on 12/27 with wound Care        Hematology   * S/P admn tPA in diff fac w/n last 24 hr bef adm to crnt fac      S/p tPA   --Continue Neuro checks q 1hr  -- Vascular Neurology consulted  -- PMRI with right cerebellar strokes, MRA negative  -- SBP goal <180  -- PT/OT/Speech            Endocrine   Poorly controlled type 2 diabetes mellitus    A1C 13.6 (12/8)  Acuchecks  PRN SSI        Other   Tobacco abuse    Continue home  dose nicotine patch            Prophylaxis:  Venous Thromboembolism: mechanical  Stress Ulcer: None  Ventilator Pneumonia: not applicable     Activity Orders          None        Full Code   Level 2      Jorge Luis Good NP  Neurocritical Care  [facility]

## 2017-12-25 NOTE — ASSESSMENT & PLAN NOTE
recently in the hospital for perineal abscess which was I&D per wife  Discussed abx with patient and spouse  Bactrim PO resumed and Diflucan resumed. They are to f/u on 12/27 with wound Care

## 2017-12-25 NOTE — ASSESSMENT & PLAN NOTE
S/p tPA   --Continue Neuro checks q 1hr  -- Vascular Neurology consulted  -- PMRI with right cerebellar strokes, MRA negative  -- SBP goal <180  -- PT/OT/Speech

## 2017-12-25 NOTE — PROGRESS NOTES
Pt discharged out of hospital to go home with family by Stroke Services. Pt given discharge instructions and AVS from Reid Casillas RN. Pt wheelchaired off unit to awaiting family members car accompanied by wife and two sons. Pt safely transferred to car from wheelchair.  Pt states he will make an appointment for echo per MD order.Stroke team notified that the pt would like to have his prescriptions sent to [facility] in [place], [place] instead of [facility]. Will continue to monitor

## 2017-12-25 NOTE — PLAN OF CARE
Problem: Occupational Therapy Goal  Goal: Occupational Therapy Goal  OT evaluation completed; Pt. Back to baseline level of functioning and does not require continued OT services.

## 2017-12-26 NOTE — PROGRESS NOTES
[facility]  Vascular Neurology  Comprehensive Stroke Center  Progress Note    Assessment/Plan:     * Embolic stroke involving right cerebellar artery    53 y/o male with right sided weakness received IV TPA at [facility]. Since no neuro deficits, no CTA head and neck done so no LVO so no IR. MRI with acute R cerebellar infarct.    Antithrombotics for secondary stroke prevention: Antiplatelets: Aspirin: 81 mg daily  Statins for secondary stroke prevention and hyperlipidemia, if present:   Statins: Atorvastatin- 40 mg daily  Aggressive risk factor modification: HTN, Smoking, DM, HLD, Diet, Exercise, Obesity, CAD  Rehab efforts: PT/OT/SLP to evaluate and treat, Recommending home  Diagnostics ordered/pending: TTE to assess cardiac function/status   VTE prophylaxis: Heparin 5000 units SQ every 8 hours  BP parameters: Infarct: Post tPA, SBP <180        S/P admn tPA in diff fac w/n last 24 hr bef adm to crnt fac    Close monitoring in NCCU for 24 hours post administration        Cytotoxic cerebral edema    2/2 stroke  Evident on imaging        Right sided weakness    Result of stroke  Resolved        Coronary artery disease involving native coronary artery without angina pectoris    Stroke risk factor        Hypertension    Stroke risk factor  SBP <180 due to TPA        Poorly controlled type 2 diabetes mellitus    Stroke risk factor  Hgb A1C 11.8%  SSI        Tobacco abuse    Stroke risk factor  Smoking cessation  Nicotine patch             12/25/17 - MRI with acute R cerebellar infarct. Workup complete except Echo, will order outpatient. Therapy recommending d/c home. Pt stable for d/c home today.    STROKE DOCUMENTATION   Acute Stroke Times   Last Known Normal Date: 11/23/17  Last Known Normal Time: 2354  Symptom Onset Date: 11/23/17  Symptom Onset Time: 2354  Stroke Team Called Date: 11/24/17  Stroke Team Called Time: 0103  Stroke Team Arrival Date: 11/24/17  Stroke Team Arrival Time:  0310  CT Interpretation Time: 0105  Decision to Treat Time for Alteplase: 0136    NIH Scale:  1a. Level Of Consciousness: 0-->Alert: keenly responsive  1b. LOC Questions: 0-->Answers both questions correctly  1c. LOC Commands: 0-->Performs both tasks correctly  2. Best Gaze: 0-->Normal  3. Visual: 0-->No visual loss  4. Facial Palsy: 0-->Normal symmetrical movements  5a. Motor Arm, Left: 0-->No drift: limb holds 90 (or 45) degrees for full 10 secs  5b. Motor Arm, Right: 0-->No drift: limb holds 90 (or 45) degrees for full 10 secs  6a. Motor Leg, Left: 0-->No drift: leg holds 30 degree position for full 5 secs  6b. Motor Leg, Right: 0-->No drift: leg holds 30 degree position for full 5 secs  7. Limb Ataxia: 0-->Absent  8. Sensory: 0-->Normal: no sensory loss  9. Best Language: 0-->No aphasia: normal  10. Dysarthria: 0-->Normal  11. Extinction and Inattention (formerly Neglect): 0-->No abnormality  Total (NIH Stroke Scale): 0       Modified [place] Score: 0  [place] Coma Scale:    ABCD2 Score:    [id number] Score:   HAS -BLED Score:   ICH Score:   Hunt & Nelson Classification:      Hemorrhagic change of an Ischemic Stroke: Does this patient have an ischemic stroke with hemorrhagic changes? No     Neurologic Chief Complaint: R Cerebellar infarct    Subjective:     Interval History: Patient is seen for follow-up neurological assessment and treatment recommendations: JESUS. Pt states lost his health insurance and hasn't been able to fill his medications for several months.    HPI, Past Medical, Family, and Social History remains the same as documented in the initial encounter.     Review of Systems   Constitutional: Negative for chills and fever.   Eyes: Negative for discharge and visual disturbance.   Musculoskeletal: Negative for joint swelling and neck stiffness.   Neurological: Negative for facial asymmetry, speech difficulty and weakness.   Psychiatric/Behavioral: Negative for agitation, behavioral problems and  confusion.     Scheduled Meds:  Continuous Infusions:  PRN Meds:    Objective:     Vital Signs (Most Recent):  Temp: 98 °F (36.7 °C) (12/25/17 1102)  Pulse: 101 (12/25/17 1302)  Resp: (!) 23 (12/25/17 1302)  BP: 108/75 (12/25/17 1302)  SpO2: 98 % (12/25/17 1302)  BP Location: Right arm    Vital Signs Range (Last 24H):  Temp:  [98 °F (36.7 °C)-98.5 °F (36.9 °C)]   Pulse:  []   Resp:  [16-23]   BP: ()/(55-89)   SpO2:  [93 %-100 %]   BP Location: Right arm    Physical Exam   Constitutional: He is oriented to person, place, and time. He appears well-developed and well-nourished. No distress.   HENT:   Head: Normocephalic and atraumatic.   Eyes: Conjunctivae and EOM are normal.   Cardiovascular: Normal rate.    Pulmonary/Chest: Effort normal. No respiratory distress.   Musculoskeletal: Normal range of motion. He exhibits no edema.   Neurological: He is alert and oriented to person, place, and time. No cranial nerve deficit or sensory deficit.   Skin: Skin is warm and dry.   Psychiatric: He has a normal mood and affect. His behavior is normal. Judgment and thought content normal.       Neurological Exam:   LOC: alert  Attention Span: Good   Language: No aphasia  Articulation: No dysarthria  Orientation: Person, Place, Time   Visual Fields: Full  EOM (CN III, IV, VI): Full/intact  Facial Movement (CN VII): Symmetric facial expression    Motor: 5/5 all extremities  Cebellar: No evidence of appendicular or axial ataxia  Sensation: Intact to light touch, temperature  Tone: Normal tone throughout    Laboratory:  CMP:   Recent Labs  Lab 12/25/17  0327   CALCIUM 9.1   ALBUMIN 2.9*   PROT 6.5      K 4.4   CO2 21*      BUN 21*   CREATININE 1.1   ALKPHOS 63   ALT 13   AST 28   BILITOT 0.3     CBC:   Recent Labs  Lab 12/25/17  0327   WBC 10.20   RBC 4.06*   HGB 11.9*   HCT 36.6*      MCV 90   MCH 29.3   MCHC 32.5     Lipid Panel:   Recent Labs  Lab 12/24/17  0405   CHOL 255*   LDLCALC 164.6*   HDL 43    TRIG 237*     Coagulation:   Recent Labs  Lab 12/24/17  0405   INR 1.3*     Platelet Aggregation Study: No results for input(s): PLTAGG, PLTAGINTERP, PLTAGREGLACO, ADPPLTAGGREG in the last 168 hours.  Hgb A1C:   Recent Labs  Lab 12/24/17 0405   HGBA1C 11.8*     TSH:   Recent Labs  Lab 12/24/17 0405   TSH 1.862       Diagnostic Results       Brain Imaging     MRI Brain 12/25/17  Scattered foci of restricted diffusion/low ADC signal in the right cerebellar hemisphere consistent with acute infarcts.    CT Head 12/24/17  No acute intracranial process.    No clinically significant discrepancy with overnight teleradiology report.      Vessel Imaging     MRA Head/Neck 12/25/17  MR angiogram of the head and neck appears within normal limits.      Cardiac Imaging     Echo to be performed outpatient.      Shalini Albert PA-C  Comprehensive Stroke Center  Department of Vascular Neurology   [facility]

## 2017-12-26 NOTE — ASSESSMENT & PLAN NOTE
51 y/o male with right sided weakness received IV TPA at [facility]. Since no neuro deficits, no CTA head and neck done so no LVO so no IR. MRI with acute R cerebellar infarct.    Antithrombotics for secondary stroke prevention: Antiplatelets: Aspirin: 81 mg daily  Statins for secondary stroke prevention and hyperlipidemia, if present:   Statins: Atorvastatin- 40 mg daily  Aggressive risk factor modification: HTN, Smoking, DM, HLD, Diet, Exercise, Obesity, CAD  Rehab efforts: PT/OT/SLP to evaluate and treat, Recommending home  Diagnostics ordered/pending: TTE to assess cardiac function/status   VTE prophylaxis: Heparin 5000 units SQ every 8 hours  BP parameters: Infarct: Post tPA, SBP <180

## 2017-12-26 NOTE — PT/OT/SLP DISCHARGE
Physical Therapy Discharge Summary    Name: Ismael Gibson  MRN: [id number]   Principal Problem: Embolic stroke involving right cerebellar artery     Patient Discharged from acute Physical Therapy on 12/26/17.  Please refer to prior PT noted date on 12/25/17 for functional status.     Assessment:     Patient was discharged unexpectedly.  Information required to complete an accurate discharge summary is unknown.  Refer to therapy initial evaluation and last progress note for initial and most recent functional status and goal achievement.  Recommendations made may be found in medical record.    Objective:     GOALS:    Physical Therapy Goals        Problem: Physical Therapy Goal    Goal Priority Disciplines Outcome Goal Variances Interventions   Physical Therapy Goal     PT/OT, PT Ongoing (interventions implemented as appropriate)     Description:  Goals to be met by: 12/29/2017    Patient will increase functional independence with mobility by performing:    Bed to chair transfer via Stand Pivot with Modified Independent using Straight Cane.  Gait  x 150 feet with Modified [place] using Straight Cane.    Dynamic standing for 10 minutes with Modified [place] using Straight Cane.  Able to tolerate exercise for 15-20 reps with independence.  Patient and family able to teachback stroke & positioning education independently.                      Reasons for Discontinuation of Therapy Services  Transfer to alternate level of care.      Plan:     Patient Discharged to: Home no PT services needed.    Becky Sheffield, PT  12/26/2017

## 2017-12-26 NOTE — SUBJECTIVE & OBJECTIVE
Neurologic Chief Complaint: R Cerebellar infarct    Subjective:     Interval History: Patient is seen for follow-up neurological assessment and treatment recommendations: JESUS. Pt states lost his health insurance and hasn't been able to fill his medications for several months.    HPI, Past Medical, Family, and Social History remains the same as documented in the initial encounter.     Review of Systems   Constitutional: Negative for chills and fever.   Eyes: Negative for discharge and visual disturbance.   Musculoskeletal: Negative for joint swelling and neck stiffness.   Neurological: Negative for facial asymmetry, speech difficulty and weakness.   Psychiatric/Behavioral: Negative for agitation, behavioral problems and confusion.     Scheduled Meds:  Continuous Infusions:  PRN Meds:    Objective:     Vital Signs (Most Recent):  Temp: 98 °F (36.7 °C) (12/25/17 1102)  Pulse: 101 (12/25/17 1302)  Resp: (!) 23 (12/25/17 1302)  BP: 108/75 (12/25/17 1302)  SpO2: 98 % (12/25/17 1302)  BP Location: Right arm    Vital Signs Range (Last 24H):  Temp:  [98 °F (36.7 °C)-98.5 °F (36.9 °C)]   Pulse:  []   Resp:  [16-23]   BP: ()/(55-89)   SpO2:  [93 %-100 %]   BP Location: Right arm    Physical Exam   Constitutional: He is oriented to person, place, and time. He appears well-developed and well-nourished. No distress.   HENT:   Head: Normocephalic and atraumatic.   Eyes: Conjunctivae and EOM are normal.   Cardiovascular: Normal rate.    Pulmonary/Chest: Effort normal. No respiratory distress.   Musculoskeletal: Normal range of motion. He exhibits no edema.   Neurological: He is alert and oriented to person, place, and time. No cranial nerve deficit or sensory deficit.   Skin: Skin is warm and dry.   Psychiatric: He has a normal mood and affect. His behavior is normal. Judgment and thought content normal.       Neurological Exam:   LOC: alert  Attention Span: Good   Language: No aphasia  Articulation: No  dysarthria  Orientation: Person, Place, Time   Visual Fields: Full  EOM (CN III, IV, VI): Full/intact  Facial Movement (CN VII): Symmetric facial expression    Motor: 5/5 all extremities  Cebellar: No evidence of appendicular or axial ataxia  Sensation: Intact to light touch, temperature  Tone: Normal tone throughout    Laboratory:  CMP:   Recent Labs  Lab 12/25/17  0327   CALCIUM 9.1   ALBUMIN 2.9*   PROT 6.5      K 4.4   CO2 21*      BUN 21*   CREATININE 1.1   ALKPHOS 63   ALT 13   AST 28   BILITOT 0.3     CBC:   Recent Labs  Lab 12/25/17  0327   WBC 10.20   RBC 4.06*   HGB 11.9*   HCT 36.6*      MCV 90   MCH 29.3   MCHC 32.5     Lipid Panel:   Recent Labs  Lab 12/24/17  0405   CHOL 255*   LDLCALC 164.6*   HDL 43   TRIG 237*     Coagulation:   Recent Labs  Lab 12/24/17  0405   INR 1.3*     Platelet Aggregation Study: No results for input(s): PLTAGG, PLTAGINTERP, PLTAGREGLACO, ADPPLTAGGREG in the last 168 hours.  Hgb A1C:   Recent Labs  Lab 12/24/17  0405   HGBA1C 11.8*     TSH:   Recent Labs  Lab 12/24/17  0405   TSH 1.862       Diagnostic Results       Brain Imaging     MRI Brain 12/25/17  Scattered foci of restricted diffusion/low ADC signal in the right cerebellar hemisphere consistent with acute infarcts.    CT Head 12/24/17  No acute intracranial process.    No clinically significant discrepancy with overnight teleradiology report.      Vessel Imaging     MRA Head/Neck 12/25/17  MR angiogram of the head and neck appears within normal limits.      Cardiac Imaging     Echo to be performed outpatient.

## 2017-12-27 LAB
BACTERIA SPEC AEROBE CULT: NORMAL
GRAM STN SPEC: NORMAL

## 2017-12-27 NOTE — ASSESSMENT & PLAN NOTE
Stroke risk factor  Hgb A1C 11.8%  Resume home regimen  F/U PCP to adjust regimen for better BG control

## 2017-12-27 NOTE — DISCHARGE SUMMARY
[facility]  Vascular Neurology  Comprehensive Stroke Center  Discharge Summary     Summary:     Admit Date: 12/24/2017  3:01 AM    Discharge Date and Time: 12/25/2017  3:00 PM    Attending Physician: Trent Echols MD    Discharge Provider: Shalini Albert PA-C    History of Present Illness: 53 y/o male who started with ringing in his left ear that got progressively worse and then all of a sudden started with twitches in right hand/arm and then leg. He was unable to walk and had to have assistance to get to the car to go to [facility]. With no improvement in symptoms telemedicine call with Dr Rodgers was done with CT scan with no acute process recommendation was to give IV TPA and transfer to [facility]. Patient states that he then got a headache and vomited prior to getting to the hospital.  Upon arrival patient with no neuro deficits. States back to normal.   Risk factors HTN, DM, CAD    Hospital Course (synopsis of major diagnoses, care, treatment, and services provided during the course of the hospital stay): Mr. Gibson was admitted to [facility] for 24-hr close monitoring s/p tPA-administration. MRI Brain showed acute R cerebellar infarct. Stroke workup was completed except for Echocardiogram, as department was closed for holiday. Pt eager to go and we did not want this to delay his discharge so pt agreed to obtain study as an outpatient. Discussed his uncontrolled Diabetes (A1c 11.8%) and the need for strict management to reduce his risk of stroke. Pt states he has been without insurance and medications for several months, but will have insurance starting Jan 1 and will be able resume all medications, appts, etc at that time. Recommend ASA 81mg Daily and Atorvastatin 40mg Daily. Also recommend maximal risk factor modification for secondary stroke prevention including control of existing HTN, HLD, DM, Obesity, diet, exercise, smoking cessation, etc. Mr. Gibson was evaluated and treated  by PT, OT, and SLP who recommended d/c home with no further therapy needs. He was instructed to make hospital follow-up with his PCP within 1 week of discharge, and to follow up with Vascular Neurology within 4-6 weeks. Filled remainder of Bactrim course for pt's known subcutaneous infection, previously on prior to hospital presentation. Pt neurologically stable for d/c home with family.    STROKE DOCUMENTATION   Acute Stroke Times   Last Known Normal Date: 11/23/17  Last Known Normal Time: 2354  Symptom Onset Date: 11/23/17  Symptom Onset Time: 2354  Stroke Team Called Date: 11/24/17  Stroke Team Called Time: 0103  Stroke Team Arrival Date: 11/24/17  Stroke Team Arrival Time: 0310  CT Interpretation Time: 0105  Decision to Treat Time for Alteplase: 0136     NIH Scale:  1a. Level Of Consciousness: 0-->Alert: keenly responsive  1b. LOC Questions: 0-->Answers both questions correctly  1c. LOC Commands: 0-->Performs both tasks correctly  2. Best Gaze: 0-->Normal  3. Visual: 0-->No visual loss  4. Facial Palsy: 0-->Normal symmetrical movements  5a. Motor Arm, Left: 0-->No drift: limb holds 90 (or 45) degrees for full 10 secs  5b. Motor Arm, Right: 0-->No drift: limb holds 90 (or 45) degrees for full 10 secs  6a. Motor Leg, Left: 0-->No drift: leg holds 30 degree position for full 5 secs  6b. Motor Leg, Right: 0-->No drift: leg holds 30 degree position for full 5 secs  7. Limb Ataxia: 0-->Absent  8. Sensory: 0-->Normal: no sensory loss  9. Best Language: 0-->No aphasia: normal  10. Dysarthria: 0-->Normal  11. Extinction and Inattention (formerly Neglect): 0-->No abnormality  Total (NIH Stroke Scale): 0        Modified Roney Score: 0  Anuradha Coma Scale:    ABCD2 Score:    [id number] Score:   HAS -BLED Score:   ICH Score:   Hunt & Nelson Classification:       Assessment/Plan:       Diagnostic Results         Brain Imaging      MRI Brain 12/25/17  Scattered foci of restricted diffusion/low ADC signal in the right  cerebellar hemisphere consistent with acute infarcts.     CT Head 12/24/17  No acute intracranial process.    No clinically significant discrepancy with overnight teleradiology report.        Vessel Imaging      MRA Head/Neck 12/25/17  MR angiogram of the head and neck appears within normal limits.        Cardiac Imaging      Echo to be performed outpatient.        Interventions: IV t-PA    Complications: None    Disposition: Home or Self Care    Final Active Diagnoses:    Diagnosis Date Noted POA    PRINCIPAL PROBLEM:  Embolic stroke involving right cerebellar artery [I63.441] 12/25/2017 Unknown    S/P admn tPA in diff fac w/n last 24 hr bef adm to crnt fac [Z92.82] 12/24/2017 Not Applicable    Cytotoxic cerebral edema [G93.6] 12/25/2017 Unknown    Right sided weakness [R53.1] 12/24/2017 Yes    Hypertension [I10] 12/07/2017 Yes    Poorly controlled type 2 diabetes mellitus [E11.65] 12/07/2017 Yes    Coronary artery disease involving native coronary artery without angina pectoris [I25.10] 12/07/2017 Yes    Tobacco abuse [Z72.0] 12/07/2017 Yes    Ischemic stroke [I63.9]  Unknown    Hyperlipidemia [E78.5] 12/24/2017 Unknown    Weakness [R53.1] 12/24/2017 Yes    Scrotal abscess [N49.2] 12/07/2017 Yes      Problems Resolved During this Admission:    Diagnosis Date Noted Date Resolved POA     * Embolic stroke involving right cerebellar artery    53 y/o male with right sided weakness received IV TPA at [facility]. Since no neuro deficits, no CTA head and neck done so no LVO so no IR. MRI with acute R cerebellar infarct.    Antithrombotics for secondary stroke prevention: Antiplatelets: Aspirin: 81 mg daily  Statins for secondary stroke prevention and hyperlipidemia, if present:   Statins: Atorvastatin- 40 mg daily  Aggressive risk factor modification: HTN, Smoking, DM, HLD, Diet, Exercise, Obesity, CAD  Rehab efforts: PT/OT/SLP to evaluate and treat, Recommending home  Diagnostics ordered/pending: TTE  to assess cardiac function/status , To be done outpatient        S/P admn tPA in diff fac w/n last 24 hr bef adm to crnt fac    Completed close monitoring in [facility] for 24 hours post administration        Cytotoxic cerebral edema    2/2 stroke  Evident on imaging        Right sided weakness    Result of stroke  Resolved        Coronary artery disease involving native coronary artery without angina pectoris    Stroke risk factor        Hypertension    Stroke risk factor  SBP <130        Poorly controlled type 2 diabetes mellitus    Stroke risk factor  Hgb A1C 11.8%  Resume home regimen  F/U PCP to adjust regimen for better BG control        Tobacco abuse    Stroke risk factor  Smoking cessation  Nicotine patch            Recommendations:     Post-discharge complication risks: None    Stroke Education given to: patient and family    Follow-up in Stroke Clinic in 4-6 weeks.    Discharge Plan:  Antithrombotics: Aspirin 81mg  Statin: Atorvastatin 40mg  Smoking Cessation  Aggresive risk factor modification:  Hypertension  Smoking  Diabetes  High Cholesterol  Diet  Exercise  Obesity  Carotid Artery disease    Follow Up:  Follow-up Information     Dave Diaz MD In 1 week.    Specialty:  Family Medicine  Why:  Call your PCP to make this appt - Hospital follow-up  Contact information:  [address] [place] [id number] [phone]             [facility] VASCULAR NEUROLOGY In 4 weeks.    Specialty:  Vascular Neurology  Why:  We will call you to make this appt  Contact information:  [address]  [facility] [address] [phone]                 Patient Instructions:     2D echo with color flow doppler and bubble contrast   Standing Status: Future  Standing Exp. Date: 12/25/18   Order Comments: R/O intracardiac shunt         Medications:  Reconciled Home Medications:   Discharge Medication List as of 12/25/2017  2:18 PM      START taking these medications    Details   aspirin 81 MG Chew Take 1 tablet (81  mg total) by mouth once daily., Starting Tue 12/26/2017, Until Wed 12/26/2018, OTC      atorvastatin (LIPITOR) 40 MG tablet Take 1 tablet (40 mg total) by mouth once daily., Starting Tue 12/26/2017, Until Wed 12/26/2018, Normal      sulfamethoxazole-trimethoprim 800-160mg (BACTRIM DS) 800-160 mg Tab Take 1 tablet by mouth 2 (two) times daily., Starting Mon 12/25/2017, Normal         CONTINUE these medications which have NOT CHANGED    Details   acetaminophen-codeine 300-30mg (TYLENOL #3) 300-30 mg Tab Take 1 tablet by mouth as needed., Historical Med      blood sugar diagnostic Strp 1 each by Misc.(Non-Drug; Combo Route) route 2 (two) times daily before meals. For True Metrix meter, Starting Mon 12/11/2017, Normal      fluconazole (DIFLUCAN) 100 MG tablet Take 1 tablet (100 mg total) by mouth once daily., Starting Thu 12/21/2017, Until Thu 12/28/2017, Normal      insulin glargine (LANTUS SOLOSTAR) 100 unit/mL (3 mL) InPn pen Inject 40 Units into the skin every evening., Starting Mon 12/11/2017, Until Tue 12/11/2018, Normal      lancets Misc 1 each by Misc.(Non-Drug; Combo Route) route 2 (two) times daily before meals. For True Metrix meter, Starting Mon 12/11/2017, Normal      lidocaine HCL 4% (XYLOCAINE) 4 % (40 mg/mL) external solution Apply 4 mLs topically daily as needed. Dressing changes, Starting Mon 12/11/2017, Normal      lisinopril (PRINIVIL,ZESTRIL) 20 MG tablet Take 1 tablet (20 mg total) by mouth once daily., Starting Mon 12/11/2017, Normal      nicotine (NICODERM CQ) 21 mg/24 hr Place 1 patch onto the skin once daily., Starting Thu 12/21/2017, Normal      pregabalin (LYRICA) 300 MG Cap Take 300 mg by mouth 2 (two) times daily., Historical Med         STOP taking these medications       simvastatin (ZOCOR) 40 MG tablet Comments:   Reason for Stopping:               Shalini Albert PA-C  [facility]  Department of Vascular Neurology   [facility]

## 2017-12-27 NOTE — ASSESSMENT & PLAN NOTE
53 y/o male with right sided weakness received IV TPA at [facility]. Since no neuro deficits, no CTA head and neck done so no LVO so no IR. MRI with acute R cerebellar infarct.    Antithrombotics for secondary stroke prevention: Antiplatelets: Aspirin: 81 mg daily  Statins for secondary stroke prevention and hyperlipidemia, if present:   Statins: Atorvastatin- 40 mg daily  Aggressive risk factor modification: HTN, Smoking, DM, HLD, Diet, Exercise, Obesity, CAD  Rehab efforts: PT/OT/SLP to evaluate and treat, Recommending home  Diagnostics ordered/pending: TTE to assess cardiac function/status , To be done outpatient

## 2017-12-28 LAB
BACTERIA BLD CULT: NORMAL

## 2017-12-29 LAB — BACTERIA BLD CULT: NORMAL

## 2018-01-08 ENCOUNTER — TELEPHONE (OUTPATIENT)
Dept: NEUROSURGERY | Facility: HOSPITAL | Age: 53
End: 2018-01-08

## 2018-01-08 PROBLEM — L92.9 HYPERGRANULATION: Status: ACTIVE | Noted: 2018-01-08

## 2018-01-08 NOTE — TELEPHONE ENCOUNTER
"Attempted to contact patient via number on file.  No answer.  The following message was left for patient to return call "Hello.  This is a message for Ismael Gibson.  My name is Deon and I am a nurse at [facility].  If you could give me call back at [phone] between the hours of 08:00 AM and 04:00 PM, Monday through Friday.  Thanks so much and have a great day."  Will try again later.   "

## 2018-01-09 ENCOUNTER — TELEPHONE (OUTPATIENT)
Dept: NEUROSURGERY | Facility: HOSPITAL | Age: 53
End: 2018-01-09

## 2018-01-09 NOTE — TELEPHONE ENCOUNTER
"Attempted to contact patient via number on file.  No answer.  The following message was left for patient to return call "Hello.  This is a message for Ismael Gibson.  My name is Deon and I am a nurse at [facility] [employer].  If you could give me call back at [phone] between the hours of 08:00 AM and 04:00 PM, Monday through Friday.  Thanks so much and have a great day."  Will try again later.   "

## 2018-01-10 ENCOUNTER — TELEPHONE (OUTPATIENT)
Dept: NEUROSURGERY | Facility: HOSPITAL | Age: 53
End: 2018-01-10

## 2018-01-10 NOTE — TELEPHONE ENCOUNTER
"Attempted to contact patient via number on file.  No answer.  The following message was left for patient to return call "Hello.  This is a message for Ismael Gibson.  My name is Deon and I am a nurse at [facility].  If you could give me call back at [phone] between the hours of 08:00 AM and 04:00 PM, Monday through Friday.  Thanks so much and have a great day."  Third unsuccessful attempt.   "

## 2018-03-28 ENCOUNTER — TELEPHONE (OUTPATIENT)
Dept: ADMINISTRATIVE | Facility: OTHER | Age: 53
End: 2018-03-28

## 2018-11-09 PROBLEM — R53.1 ACUTE LEFT-SIDED WEAKNESS: Status: ACTIVE | Noted: 2018-11-09

## 2018-11-09 PROBLEM — I63.9 ACUTE CVA (CEREBROVASCULAR ACCIDENT): Status: ACTIVE | Noted: 2017-12-24

## 2018-11-10 PROBLEM — Z79.4 TYPE 2 DIABETES MELLITUS WITH HYPERGLYCEMIA, WITH LONG-TERM CURRENT USE OF INSULIN: Status: ACTIVE | Noted: 2017-12-07

## 2018-11-11 DIAGNOSIS — F41.9 ANXIETY: ICD-10-CM

## 2018-11-11 DIAGNOSIS — F32.A DEPRESSION, UNSPECIFIED DEPRESSION TYPE: Primary | ICD-10-CM

## 2018-11-11 PROBLEM — I50.22 CHRONIC SYSTOLIC HEART FAILURE: Status: ACTIVE | Noted: 2018-11-11

## 2018-11-11 RX ORDER — SERTRALINE HYDROCHLORIDE 25 MG/1
50 TABLET, FILM COATED ORAL DAILY
Qty: 60 TABLET | Refills: 2 | Status: SHIPPED | OUTPATIENT
Start: 2018-11-11 | End: 2018-11-19

## 2018-11-12 DIAGNOSIS — Z95.818 STATUS POST PLACEMENT OF IMPLANTABLE LOOP RECORDER: Primary | ICD-10-CM

## 2018-11-12 DIAGNOSIS — I63.9 ISCHEMIC STROKE: ICD-10-CM

## 2018-11-20 ENCOUNTER — TELEPHONE (OUTPATIENT)
Dept: CARDIOLOGY | Facility: CLINIC | Age: 53
End: 2018-11-20

## 2018-12-19 ENCOUNTER — TELEPHONE (OUTPATIENT)
Dept: CARDIOLOGY | Facility: CLINIC | Age: 53
End: 2018-12-19

## 2018-12-19 NOTE — TELEPHONE ENCOUNTER
Attempted to contact patient x3 at [phone], no answer. Left voicemail to return call so we could reschedule pt wound check apt., also home monitoring is not working.  Advised to please return call.

## 2019-01-07 ENCOUNTER — TELEPHONE (OUTPATIENT)
Dept: NEUROLOGY | Facility: CLINIC | Age: 54
End: 2019-01-07

## 2019-01-07 NOTE — TELEPHONE ENCOUNTER
Returned call and spoke with patient's wife, Benita. Appointment reschedule per patient's request. Appointment schedule for 02/21/2019 at 1:45 pm. Benita verbalized understanding.

## 2019-01-07 NOTE — TELEPHONE ENCOUNTER
----- Message from Faustina Vides sent at 1/7/2019 11:07 AM CST -----  Contact: wife, Benita Gibson   Patient needs to reschedule appointment. Patient has another appointment he can not miss tomorrow.  Please call wife, Benita Gibson at [phone]. Thanks!

## 2019-03-06 RX ORDER — CLOPIDOGREL BISULFATE 75 MG/1
75 TABLET ORAL DAILY
Qty: 30 TABLET | Refills: 1 | Status: SHIPPED | OUTPATIENT
Start: 2019-03-06 | End: 2019-06-09 | Stop reason: SDUPTHER

## 2019-03-06 RX ORDER — LISINOPRIL 2.5 MG/1
2.5 TABLET ORAL DAILY
Qty: 30 TABLET | Refills: 1 | Status: SHIPPED | OUTPATIENT
Start: 2019-03-06 | End: 2019-06-09 | Stop reason: SDUPTHER

## 2019-03-06 NOTE — TELEPHONE ENCOUNTER
----- Message from Gomez Sebastian sent at 3/6/2019  9:39 AM CST -----  Type:  RX Refill Request    Who Called:  Benita Shukla (Spouse)     Refill or New Rx:  Refill  RX Name and Strength:    lisinopril (PRINIVIL,ZESTRIL) 2.5 MG tablet, 1XDay, 30   clopidogrel (PLAVIX) 75 mg tablet , 1XDay, 30    Preferred Pharmacy with phone number:    [facility]/pharmacy #[id number] [address] [address]  Phone: [phone] Fax: [phone]    Local or Mail Order:  Local  Ordering Provider:  Clive Springer Call Back Number:  [phone]  Additional Information:

## 2019-03-12 ENCOUNTER — TELEPHONE (OUTPATIENT)
Dept: NEUROLOGY | Facility: CLINIC | Age: 54
End: 2019-03-12

## 2019-03-12 NOTE — TELEPHONE ENCOUNTER
Attempted to return call; no answer and voicemail not set up; no hospital notes seen from our providers; pt needs consult appt.

## 2019-03-12 NOTE — TELEPHONE ENCOUNTER
----- Message from Jorge Rodriguez sent at 3/11/2019  3:52 PM CDT -----  Contact: Wife, Benita Joy want to speak with a nurse regarding scheduling hospital follow up appointment was unable to schedule appointment please call back at [phone]

## 2019-03-14 PROBLEM — R55 SYNCOPE: Status: ACTIVE | Noted: 2019-03-14

## 2019-03-14 PROBLEM — Z86.73 HISTORY OF CVA (CEREBROVASCULAR ACCIDENT): Status: ACTIVE | Noted: 2017-12-24

## 2019-03-15 ENCOUNTER — CLINICAL SUPPORT (OUTPATIENT)
Dept: CARDIOLOGY | Facility: CLINIC | Age: 54
End: 2019-03-15
Attending: INTERNAL MEDICINE
Payer: MEDICARE

## 2019-03-15 DIAGNOSIS — Z95.818 STATUS POST PLACEMENT OF IMPLANTABLE LOOP RECORDER: ICD-10-CM

## 2019-03-15 DIAGNOSIS — I63.9 ISCHEMIC STROKE: ICD-10-CM

## 2019-03-15 PROBLEM — R56.9 SEIZURE: Status: ACTIVE | Noted: 2019-03-14

## 2019-03-16 PROBLEM — R94.39 ABNORMAL STRESS TEST: Status: ACTIVE | Noted: 2019-03-16

## 2019-03-18 ENCOUNTER — TELEPHONE (OUTPATIENT)
Dept: NEUROLOGY | Facility: CLINIC | Age: 54
End: 2019-03-18

## 2019-03-18 DIAGNOSIS — Z95.818 STATUS POST PLACEMENT OF IMPLANTABLE LOOP RECORDER: Primary | ICD-10-CM

## 2019-03-18 DIAGNOSIS — I63.9 ISCHEMIC STROKE: ICD-10-CM

## 2019-03-18 NOTE — TELEPHONE ENCOUNTER
----- Message from Melanie Sesay MD sent at 3/16/2019 11:51 AM CDT -----  Please schedule hospital follow up (routine 6-8 weeks) with Dr Freedman for stroke (also has seizure but main thing is stroke)

## 2019-03-18 NOTE — TELEPHONE ENCOUNTER
Left voicemail on pt phone with f/u appt date/time with [facility] w Dr. Freedman; appt slip mailed.

## 2019-03-28 ENCOUNTER — CLINICAL SUPPORT (OUTPATIENT)
Dept: CARDIOLOGY | Facility: CLINIC | Age: 54
End: 2019-03-28
Attending: INTERNAL MEDICINE
Payer: MEDICARE

## 2019-03-28 DIAGNOSIS — I63.9 ISCHEMIC STROKE: ICD-10-CM

## 2019-03-28 DIAGNOSIS — Z95.818 STATUS POST PLACEMENT OF IMPLANTABLE LOOP RECORDER: ICD-10-CM

## 2019-05-11 RX ORDER — LISINOPRIL 2.5 MG/1
TABLET ORAL
Qty: 30 TABLET | Refills: 1 | OUTPATIENT
Start: 2019-05-11

## 2019-05-11 RX ORDER — CLOPIDOGREL BISULFATE 75 MG/1
TABLET ORAL
Qty: 30 TABLET | Refills: 1 | OUTPATIENT
Start: 2019-05-11

## 2019-05-13 ENCOUNTER — TELEPHONE (OUTPATIENT)
Dept: CARDIOLOGY | Facility: CLINIC | Age: 54
End: 2019-05-13

## 2019-05-13 NOTE — TELEPHONE ENCOUNTER
Please advise: patient need refill on plavix and lisinopril. Med list also shows he is taking eliquis. Which should he be taking? Appt scheduled for 6/13

## 2019-05-13 NOTE — TELEPHONE ENCOUNTER
----- Message from Francia Freitas sent at 5/13/2019 11:25 AM CDT -----  Contact: [phone]  Patient requesting a refill on lisinopril and Plavix.    Patient will be using   [facility]/pharmacy #[id number] [address] [address]  Phone: [phone] Fax: [phone]    Please call patient at [phone].    Thanks!

## 2019-05-13 NOTE — TELEPHONE ENCOUNTER
----- Message from Whit Shelby sent at 5/13/2019  8:24 AM CDT -----  Family member called and stated pt needed a refill on his plavix and lisinopril.      Please advise.

## 2019-06-04 NOTE — TELEPHONE ENCOUNTER
----- Message from Mira Tolentino sent at 6/4/2019 10:15 AM CDT -----  Type:  RX Refill Request    Who Called:  Spouse (Benita)  RX Name and Strength:  levETIRAcetam (KEPPRA) 750 MG  Preferred Pharmacy with phone number:  [facility] in [facility] Call Back Number:  [phone]  Additional Information: Stated patient is out of medication

## 2019-06-04 NOTE — TELEPHONE ENCOUNTER
----- Message from Mira Tolentino sent at 6/4/2019 10:12 AM CDT -----  Type: Needs to reschedule missed appointment    Who Called:  Spouse (Benita)  Best Call Back Number: [phone]  Additional Information: Patient missed previous appointment due to was in hospital/needs to reschedule/requesting June 13th if possible/please call back to reschedule or advise.

## 2019-06-05 RX ORDER — LEVETIRACETAM 750 MG/1
750 TABLET ORAL 2 TIMES DAILY
Qty: 60 TABLET | Refills: 1 | Status: SHIPPED | OUTPATIENT
Start: 2019-06-05 | End: 2019-08-05 | Stop reason: SDUPTHER

## 2019-06-05 NOTE — TELEPHONE ENCOUNTER
Will fill prescription once but he needs to be seen in order to continue prescribing.  Pt has seen my partners but I have never seen him myself

## 2019-06-09 RX ORDER — CLOPIDOGREL BISULFATE 75 MG/1
TABLET ORAL
Qty: 30 TABLET | Refills: 0 | Status: SHIPPED | OUTPATIENT
Start: 2019-06-09 | End: 2019-07-24 | Stop reason: SDUPTHER

## 2019-06-09 RX ORDER — LISINOPRIL 2.5 MG/1
TABLET ORAL
Qty: 30 TABLET | Refills: 0 | Status: SHIPPED | OUTPATIENT
Start: 2019-06-09 | End: 2019-07-24 | Stop reason: SDUPTHER

## 2019-07-09 RX ORDER — CLOPIDOGREL BISULFATE 75 MG/1
TABLET ORAL
Qty: 30 TABLET | Refills: 0 | OUTPATIENT
Start: 2019-07-09

## 2019-07-09 RX ORDER — LISINOPRIL 2.5 MG/1
TABLET ORAL
Qty: 30 TABLET | Refills: 0 | OUTPATIENT
Start: 2019-07-09

## 2019-07-29 ENCOUNTER — TELEPHONE (OUTPATIENT)
Dept: CARDIOLOGY | Facility: CLINIC | Age: 54
End: 2019-07-29

## 2019-07-29 RX ORDER — CLOPIDOGREL BISULFATE 75 MG/1
TABLET ORAL
Qty: 30 TABLET | Refills: 0 | Status: SHIPPED | OUTPATIENT
Start: 2019-07-29 | End: 2019-09-14 | Stop reason: SDUPTHER

## 2019-07-29 RX ORDER — LISINOPRIL 2.5 MG/1
TABLET ORAL
Qty: 30 TABLET | Refills: 0 | Status: SHIPPED | OUTPATIENT
Start: 2019-07-29 | End: 2019-09-14 | Stop reason: SDUPTHER

## 2019-07-29 NOTE — TELEPHONE ENCOUNTER
----- Message from Clive Duffy MD sent at 7/29/2019 11:35 AM CDT -----  JUST SENT A PRESCRIPTION REFILL PLEASE CANCEL CUT THE PATIENT HAS NEVER BEEN SEEN IN THE CLINIC NEEDS OFFICE VISIT FOR ANY MEDICINE PLEASE CANCEL THAT PRESCRIPTION

## 2019-07-29 NOTE — TELEPHONE ENCOUNTER
Spoke to pharmacist and advised to cancel both prescriptions. Pharmacist verbalized understanding.

## 2019-08-06 RX ORDER — LEVETIRACETAM 750 MG/1
750 TABLET ORAL 2 TIMES DAILY
Qty: 60 TABLET | Refills: 1 | Status: SHIPPED | OUTPATIENT
Start: 2019-08-06 | End: 2019-09-14 | Stop reason: SDUPTHER

## 2019-08-30 RX ORDER — LEVETIRACETAM 750 MG/1
750 TABLET ORAL 2 TIMES DAILY
Qty: 60 TABLET | Refills: 1 | OUTPATIENT
Start: 2019-08-30 | End: 2020-08-29

## 2019-09-03 RX ORDER — LISINOPRIL 2.5 MG/1
TABLET ORAL
Qty: 30 TABLET | Refills: 0 | OUTPATIENT
Start: 2019-09-03

## 2019-09-03 RX ORDER — CLOPIDOGREL BISULFATE 75 MG/1
TABLET ORAL
Qty: 30 TABLET | Refills: 0 | OUTPATIENT
Start: 2019-09-03

## 2019-09-23 ENCOUNTER — TELEPHONE (OUTPATIENT)
Dept: NEUROLOGY | Facility: CLINIC | Age: 54
End: 2019-09-23

## 2019-09-23 NOTE — TELEPHONE ENCOUNTER
----- Message from Milady Ugalde sent at 9/23/2019 11:15 AM CDT -----  Contact: Wife  Type: Needs Medical Advice    Who Called:  Benita  Gian Call Back Number:[phone] (home)   Additional Information:patient would like a call back in regards to getting an appointment

## 2019-09-23 NOTE — TELEPHONE ENCOUNTER
Spoke with Benita and informed of no available appointments at this time. Offered [facility] for sooner appt. Added to wait list. Verbalized understanding.

## 2019-10-16 RX ORDER — CLOPIDOGREL BISULFATE 75 MG/1
75 TABLET ORAL DAILY
Qty: 30 TABLET | Refills: 0 | OUTPATIENT
Start: 2019-10-16

## 2019-10-16 RX ORDER — LISINOPRIL 2.5 MG/1
2.5 TABLET ORAL DAILY
Qty: 30 TABLET | Refills: 0 | OUTPATIENT
Start: 2019-10-16

## 2019-12-09 ENCOUNTER — CLINICAL SUPPORT (OUTPATIENT)
Dept: CARDIOLOGY | Facility: CLINIC | Age: 54
End: 2019-12-09
Attending: INTERNAL MEDICINE
Payer: MEDICARE

## 2019-12-09 ENCOUNTER — LAB VISIT (OUTPATIENT)
Dept: LAB | Facility: HOSPITAL | Age: 54
End: 2019-12-09
Attending: INTERNAL MEDICINE
Payer: MEDICARE

## 2019-12-09 ENCOUNTER — OFFICE VISIT (OUTPATIENT)
Dept: CARDIOLOGY | Facility: CLINIC | Age: 54
End: 2019-12-09
Payer: MEDICARE

## 2019-12-09 VITALS
HEART RATE: 85 BPM | DIASTOLIC BLOOD PRESSURE: 84 MMHG | BODY MASS INDEX: 33.98 KG/M2 | WEIGHT: 224.19 LBS | HEIGHT: 68 IN | SYSTOLIC BLOOD PRESSURE: 138 MMHG

## 2019-12-09 DIAGNOSIS — Z95.818 STATUS POST PLACEMENT OF IMPLANTABLE LOOP RECORDER: ICD-10-CM

## 2019-12-09 DIAGNOSIS — I25.708 CORONARY ARTERY DISEASE OF BYPASS GRAFT OF NATIVE HEART WITH STABLE ANGINA PECTORIS: Primary | ICD-10-CM

## 2019-12-09 DIAGNOSIS — Z72.0 TOBACCO ABUSE: ICD-10-CM

## 2019-12-09 DIAGNOSIS — I10 ESSENTIAL HYPERTENSION: ICD-10-CM

## 2019-12-09 DIAGNOSIS — Z95.818 STATUS POST PLACEMENT OF IMPLANTABLE LOOP RECORDER: Primary | ICD-10-CM

## 2019-12-09 DIAGNOSIS — Z79.02 LONG TERM (CURRENT) USE OF ANTITHROMBOTICS/ANTIPLATELETS: ICD-10-CM

## 2019-12-09 DIAGNOSIS — E78.00 PURE HYPERCHOLESTEROLEMIA: ICD-10-CM

## 2019-12-09 DIAGNOSIS — I63.9 ISCHEMIC STROKE: ICD-10-CM

## 2019-12-09 DIAGNOSIS — E66.9 OBESITY, CLASS I, BMI 30.0-34.9 (SEE ACTUAL BMI): ICD-10-CM

## 2019-12-09 DIAGNOSIS — I25.708 CORONARY ARTERY DISEASE OF BYPASS GRAFT OF NATIVE HEART WITH STABLE ANGINA PECTORIS: ICD-10-CM

## 2019-12-09 PROBLEM — E66.811 OBESITY, CLASS I, BMI 30.0-34.9 (SEE ACTUAL BMI): Status: ACTIVE | Noted: 2019-12-09

## 2019-12-09 PROCEDURE — 99214 PR OFFICE/OUTPT VISIT, EST, LEVL IV, 30-39 MIN: ICD-10-PCS | Mod: S$PBB,,, | Performed by: INTERNAL MEDICINE

## 2019-12-09 PROCEDURE — 99999 PR PBB SHADOW E&M-EST. PATIENT-LVL IV: ICD-10-PCS | Mod: PBBFAC,,, | Performed by: INTERNAL MEDICINE

## 2019-12-09 PROCEDURE — 99999 PR PBB SHADOW E&M-EST. PATIENT-LVL IV: CPT | Mod: PBBFAC,,, | Performed by: INTERNAL MEDICINE

## 2019-12-09 PROCEDURE — 36415 COLL VENOUS BLD VENIPUNCTURE: CPT | Mod: PO

## 2019-12-09 PROCEDURE — 80053 COMPREHEN METABOLIC PANEL: CPT

## 2019-12-09 PROCEDURE — 80061 LIPID PANEL: CPT

## 2019-12-09 PROCEDURE — 99214 OFFICE O/P EST MOD 30 MIN: CPT | Mod: S$PBB,,, | Performed by: INTERNAL MEDICINE

## 2019-12-09 PROCEDURE — 99214 OFFICE O/P EST MOD 30 MIN: CPT | Mod: PBBFAC,PO | Performed by: INTERNAL MEDICINE

## 2019-12-09 RX ORDER — CLOPIDOGREL BISULFATE 75 MG/1
75 TABLET ORAL DAILY
Qty: 90 TABLET | Refills: 1 | Status: SHIPPED | OUTPATIENT
Start: 2019-12-09 | End: 2019-12-11 | Stop reason: SDUPTHER

## 2019-12-09 RX ORDER — LISINOPRIL 2.5 MG/1
2.5 TABLET ORAL DAILY
Qty: 90 TABLET | Refills: 1 | Status: SHIPPED | OUTPATIENT
Start: 2019-12-09 | End: 2019-12-11 | Stop reason: SDUPTHER

## 2019-12-09 NOTE — PROGRESS NOTES
Subjective:    Patient ID:  Ismael Gibson is a 54 y.o. male who presents for Hospital Follow Up (LOOP RECORDER); Coronary Artery Disease; Hypertension; and Hyperlipidemia        HPI  S/P [facility] IN [facility]ARCH, MISSED MANY APPTS, LAST CATH DR HOLMAN, PATENT GRAFTS, ECHO MILD MR, LOW EF, HAS LOOP RECORDER, STILL SMOKES, MAIN ISSUE WITH NEUROPATHY, BP UP, OUT OF MEDS,H/O CVA,LAST CR 1.52, STATES HAD L ARM ELECTRICITY WITH LOOP RECORDER, SE WITH OZYMPIC,  SEE ROS    Past Medical History:   Diagnosis Date    COPD (chronic obstructive pulmonary disease)     Coronary artery disease     CVA (cerebral vascular accident)     last in 11/2018    Diabetes mellitus, type 2     Hypertension     Seizure      Past Surgical History:   Procedure Laterality Date    CARDIAC SURGERY      CORONARY ARTERY BYPASS GRAFT  02/26/2018    CORONARY STENT PLACEMENT      x9    INSERTION OF IMPLANTABLE LOOP RECORDER Left 11/11/2018    Procedure: Insertion, Implantable Loop Recorder;  Surgeon: Clive Duffy MD;  Location: [facility] CATH;  Service: Cardiology;  Laterality: Left;    LEFT HEART CATHETERIZATION Right 3/16/2019    Procedure: Left heart cath;  Surgeon: Robbie Marks MD;  Location: [facility] CATH;  Service: Cardiology;  Laterality: Right;     Family History   Problem Relation Age of Onset    Coronary artery disease Father      Social History     Socioeconomic History    Marital status:      Spouse name: Not on file    Number of children: Not on file    Years of education: Not on file    Highest education level: Not on file   Occupational History    Not on file   Social Needs    Financial resource strain: Not on file    Food insecurity:     Worry: Not on file     Inability: Not on file    Transportation needs:     Medical: Not on file     Non-medical: Not on file   Tobacco Use    Smoking status: Current Every Day Smoker     Packs/day: 0.50     Types: Cigarettes    Smokeless tobacco: Never Used   Substance and Sexual  "Activity    Alcohol use: No    Drug use: No    Sexual activity: Not on file   Lifestyle    Physical activity:     Days per week: Not on file     Minutes per session: Not on file    Stress: Not on file   Relationships    Social connections:     Talks on phone: Not on file     Gets together: Not on file     Attends Taoist service: Not on file     Active member of club or organization: Not on file     Attends meetings of clubs or organizations: Not on file     Relationship status: Not on file   Other Topics Concern    Not on file   Social History Narrative    Not on file       Review of patient's allergies indicates:   Allergen Reactions    Penicillins Anaphylaxis       Current Outpatient Medications:     aspirin (ECOTRIN) 81 MG EC tablet, Take 1 tablet (81 mg total) by mouth once daily., Disp: , Rfl: 0    atorvastatin (LIPITOR) 40 MG tablet, Take 1 tablet (40 mg total) by mouth once daily., Disp: 30 tablet, Rfl: 1    BD ULTRA-FINE OTF PEN NEEDLE 32 gauge x 5/32" Ndle, DAILY WITH INSULIN, Disp: , Rfl: 5    BD ULTRA-FINE SHORT PEN NEEDLE 31 gauge x 5/16" Ndle, USE,AS DIRECEED, Disp: , Rfl: 5    blood sugar diagnostic Strp, 1 each by Misc.(Non-Drug; Combo Route) route 2 (two) times daily before meals. For True Metrix meter, Disp: 100 each, Rfl: 3    carvedilol (COREG) 6.25 MG tablet, Take 1 tablet (6.25 mg total) by mouth 2 (two) times daily with meals., Disp: 60 tablet, Rfl: 1    insulin glargine,hum.rec.anlog (INSULIN GLARGINE SUBQ), Inject 60 Units into the skin every evening., Disp: , Rfl:     lancets Misc, 1 each by Misc.(Non-Drug; Combo Route) route 2 (two) times daily before meals. For True Metrix meter, Disp: 100 each, Rfl: 3    levETIRAcetam (KEPPRA) 750 MG Tab, Take 1 tablet (750 mg total) by mouth 2 (two) times daily., Disp: 60 tablet, Rfl: 1    LYRICA 300 mg Cap, Take 1 capsule (300 mg total) by mouth once daily. (Patient taking differently: Take 300 mg by mouth 2 (two) times daily. ), " Disp: 30 capsule, Rfl: 3    multivitamin (THERAGRAN) tablet, Take 1 tablet by mouth once daily., Disp: 30 tablet, Rfl: 2    clonazePAM (KLONOPIN) 1 MG tablet, Take 1 tablet (1 mg total) by mouth 2 (two) times daily as needed for Anxiety. (Patient not taking: Reported on 12/9/2019), Disp: 20 tablet, Rfl: 1    clopidogrel (PLAVIX) 75 mg tablet, Take 1 tablet (75 mg total) by mouth once daily., Disp: 90 tablet, Rfl: 1    lisinopril (PRINIVIL,ZESTRIL) 2.5 MG tablet, Take 1 tablet (2.5 mg total) by mouth once daily., Disp: 90 tablet, Rfl: 1    Review of Systems   Constitution: Negative for chills, diaphoresis, fever, malaise/fatigue and night sweats.   HENT: Negative for congestion, hearing loss and nosebleeds.    Eyes: Positive for vision loss in left eye. Negative for blurred vision and visual disturbance (CHRONIC).   Cardiovascular: Negative for chest pain, claudication, cyanosis, dyspnea on exertion (MILD), irregular heartbeat, leg swelling, near-syncope, orthopnea, palpitations, paroxysmal nocturnal dyspnea and syncope.   Respiratory: Negative for cough, hemoptysis, shortness of breath and wheezing.    Endocrine: Negative for cold intolerance, heat intolerance and polyuria.        BG FLUCTUATES   Hematologic/Lymphatic: Negative for adenopathy. Does not bruise/bleed easily.   Skin: Negative for color change, itching, nail changes and rash.   Musculoskeletal: Negative for back pain, falls and joint pain.   Gastrointestinal: Negative for abdominal pain, change in bowel habit, dysphagia, heartburn, hematemesis, jaundice, melena and vomiting.   Genitourinary: Negative for dysuria, flank pain and frequency.   Neurological: Positive for focal weakness and loss of balance. Negative for brief paralysis, dizziness, light-headedness, numbness (NEUROPATHY), paresthesias, tremors and weakness.   Psychiatric/Behavioral: Negative for altered mental status, depression, memory loss and substance abuse. The patient is not  "nervous/anxious (PANIC ATTACKS).    Allergic/Immunologic: Negative for persistent infections.        Objective:      Vitals:    12/09/19 1442   BP: 138/84   Pulse: 85   Weight: 101.7 kg (224 lb 3.3 oz)   Height: 5' 8" (1.727 m)   PainSc:   7     Body mass index is 34.09 kg/m².    Physical Exam   Constitutional: He is oriented to person, place, and time. He appears well-developed and well-nourished. He is active.   HENT:   Head: Normocephalic and atraumatic.   Mouth/Throat: Oropharynx is clear and moist and mucous membranes are normal.   POOR DENTATION   Eyes: Conjunctivae and EOM are normal.   Neck: Normal range of motion. Neck supple. Normal carotid pulses, no hepatojugular reflux and no JVD present. Carotid bruit is not present. No edema and no erythema present. No thyromegaly present.   Cardiovascular: Normal rate and regular rhythm.  No extrasystoles are present. PMI is not displaced. Exam reveals no gallop, no distant heart sounds, no friction rub and no midsystolic click.   Murmur heard.  High-pitched blowing holosystolic murmur is present at the apex.  Pulses:       Carotid pulses are 2+ on the right side, and 2+ on the left side.       Radial pulses are 2+ on the right side, and 2+ on the left side.        Femoral pulses are 2+ on the right side, and 2+ on the left side.       Popliteal pulses are 2+ on the right side, and 2+ on the left side.        Dorsalis pedis pulses are 2+ on the right side, and 2+ on the left side.        Posterior tibial pulses are 2+ on the right side, and 2+ on the left side.   Pulmonary/Chest: Effort normal and breath sounds normal. No accessory muscle usage. No tachypnea and no bradypnea. No respiratory distress.   STERNOTOMY SCAR   Abdominal: Soft. Bowel sounds are normal. He exhibits no distension and no mass. There is no hepatosplenomegaly. There is no CVA tenderness.   Musculoskeletal: Normal range of motion. He exhibits no edema or deformity.   UNSTEADY GAIT SLIGHT SHUFFLING "   Lymphadenopathy:     He has no cervical adenopathy.   Neurological: He is alert and oriented to person, place, and time. He has normal strength. He displays no tremor. No cranial nerve deficit.   Skin: Skin is warm and dry. No cyanosis or erythema. No pallor.   Psychiatric: He has a normal mood and affect. His speech is normal and behavior is normal.               ..    Chemistry        Component Value Date/Time     12/09/2019 1539    K 4.7 12/09/2019 1539     12/09/2019 1539    CO2 28 12/09/2019 1539    BUN 19 12/09/2019 1539    CREATININE 1.9 (H) 12/09/2019 1539     (HH) 12/09/2019 1539        Component Value Date/Time    CALCIUM 8.7 12/09/2019 1539    ALKPHOS 106 12/09/2019 1539    AST 15 12/09/2019 1539    ALT 19 12/09/2019 1539    BILITOT 0.3 12/09/2019 1539    ESTGFRAFRICA 45.8 (A) 12/09/2019 1539    EGFRNONAA 39.7 (A) 12/09/2019 1539            ..  Lab Results   Component Value Date    CHOL 218 (H) 12/09/2019    CHOL 238 (H) 11/10/2018    CHOL 255 (H) 12/24/2017     Lab Results   Component Value Date    HDL 45 12/09/2019    HDL 34 (L) 11/10/2018    HDL 43 12/24/2017     Lab Results   Component Value Date    LDLCALC 101.0 12/09/2019    LDLCALC 124.2 11/10/2018    LDLCALC 164.6 (H) 12/24/2017     Lab Results   Component Value Date    TRIG 360 (H) 12/09/2019    TRIG 399 (H) 11/10/2018    TRIG 237 (H) 12/24/2017     Lab Results   Component Value Date    CHOLHDL 20.6 12/09/2019    CHOLHDL 14.3 (L) 11/10/2018    CHOLHDL 16.9 (L) 12/24/2017     ..  Lab Results   Component Value Date    WBC 10.74 09/14/2019    HGB 14.7 09/14/2019    HCT 41.1 09/14/2019    MCV 87 09/14/2019     09/14/2019       Test(s) Reviewed  I have reviewed the following in detail:  [] Stress test   [x] Angiography   [x] Echocardiogram   [x] Labs   [] Other:       Assessment:         ICD-10-CM ICD-9-CM   1. Coronary artery disease of bypass graft of native heart with stable angina pectoris I25.708 414.05     413.9   2.  Long term (current) use of antithrombotics/antiplatelets Z79.02 V58.63   3. Essential hypertension I10 401.9   4. Pure hypercholesterolemia E78.00 272.0   5. Obesity, Class I, BMI 30.0-34.9 (see actual BMI) E66.9 278.00   6. Tobacco abuse Z72.0 305.1     Problem List Items Addressed This Visit        Cardiac/Vascular    Hypertension    Relevant Orders    Comprehensive metabolic panel (Completed)    Coronary artery disease of bypass graft with stable angina pectoris - Primary    Relevant Orders    Comprehensive metabolic panel (Completed)    Pure hypercholesterolemia    Relevant Orders    Comprehensive metabolic panel (Completed)    Lipid panel (Completed)       Hematology    Long term (current) use of antithrombotics/antiplatelets    Relevant Orders    Comprehensive metabolic panel (Completed)       Endocrine    Obesity, Class I, BMI 30.0-34.9 (see actual BMI)       Other    Tobacco abuse    Relevant Orders    Ambulatory referral to Smoking Cessation Program    Comprehensive metabolic panel (Completed)           Plan:     CHECK LOOP RECORDER TODAY, TOBACCO CESSATION COUNSELING, MEDS,  DAILY, NEEDS BETTER DIABETES CONTROL, PATIENT STOPPED HIS MEDICATION OZYMPIC BECAUSE OF SIDE EFFECTS NEEDS TO SEES PCP SOON WITH WIFE, BG CONTROL, ALL CV CLINICALLY RELATIVELY STABLE, CLASS 0-1 ANGINA, NO HF, NO TIA, NO CLINICAL ARRHYTHMIA,CONTINUE CURRENT MEDS, EDUCATION, DIET, EXERCISE WEIGHT LOSS, VERY HIGH CV RISK WITH ALL THE ABOVE-MENTIONED RISK FACTOR UNCONTROLLED DIABETES NONCOMPLIANCE AND TOBACCO ABUSE, CHECK LABS SOON, RETURN CLINIC IN FEW MONTHS, EXPLAINED PLAN WITH THE PATIENT AND HIS WIFE THEY BOTH UNDERSTAND RISK      Coronary artery disease of bypass graft of native heart with stable angina pectoris  -     Comprehensive metabolic panel; Future; Expected date: 12/09/2019    Long term (current) use of antithrombotics/antiplatelets  -     Comprehensive metabolic panel; Future; Expected date: 12/09/2019    Essential  hypertension  -     Comprehensive metabolic panel; Future; Expected date: 12/09/2019    Pure hypercholesterolemia  -     Comprehensive metabolic panel; Future; Expected date: 12/09/2019  -     Lipid panel; Future; Expected date: 12/09/2019    Obesity, Class I, BMI 30.0-34.9 (see actual BMI)    Tobacco abuse  -     Ambulatory referral to Smoking Cessation Program  -     Comprehensive metabolic panel; Future; Expected date: 12/09/2019    Other orders  -     Discontinue: clopidogrel (PLAVIX) 75 mg tablet; Take 1 tablet (75 mg total) by mouth once daily.  Dispense: 90 tablet; Refill: 1  -     Discontinue: lisinopril (PRINIVIL,ZESTRIL) 2.5 MG tablet; Take 1 tablet (2.5 mg total) by mouth once daily.  Dispense: 90 tablet; Refill: 1    RTC Low level/low impact aerobic exercise 5x's/wk. Heart healthy diet and risk factor modification.    See labs and med orders.    Aerobic exercise 5x's/wk. Heart healthy diet and risk factor modification.    See labs and med orders.

## 2019-12-10 ENCOUNTER — TELEPHONE (OUTPATIENT)
Dept: CARDIOLOGY | Facility: CLINIC | Age: 54
End: 2019-12-10

## 2019-12-10 LAB
ALBUMIN SERPL BCP-MCNC: 2.7 G/DL (ref 3.5–5.2)
ALP SERPL-CCNC: 106 U/L (ref 55–135)
ALT SERPL W/O P-5'-P-CCNC: 19 U/L (ref 10–44)
ANION GAP SERPL CALC-SCNC: 8 MMOL/L (ref 8–16)
AST SERPL-CCNC: 15 U/L (ref 10–40)
BILIRUB SERPL-MCNC: 0.3 MG/DL (ref 0.1–1)
BUN SERPL-MCNC: 19 MG/DL (ref 6–20)
CALCIUM SERPL-MCNC: 8.7 MG/DL (ref 8.7–10.5)
CHLORIDE SERPL-SCNC: 103 MMOL/L (ref 95–110)
CHOLEST SERPL-MCNC: 218 MG/DL (ref 120–199)
CHOLEST/HDLC SERPL: 4.8 {RATIO} (ref 2–5)
CO2 SERPL-SCNC: 28 MMOL/L (ref 23–29)
CREAT SERPL-MCNC: 1.9 MG/DL (ref 0.5–1.4)
EST. GFR  (AFRICAN AMERICAN): 45.8 ML/MIN/1.73 M^2
EST. GFR  (NON AFRICAN AMERICAN): 39.7 ML/MIN/1.73 M^2
GLUCOSE SERPL-MCNC: 461 MG/DL (ref 70–110)
HDLC SERPL-MCNC: 45 MG/DL (ref 40–75)
HDLC SERPL: 20.6 % (ref 20–50)
LDLC SERPL CALC-MCNC: 101 MG/DL (ref 63–159)
NONHDLC SERPL-MCNC: 173 MG/DL
POTASSIUM SERPL-SCNC: 4.7 MMOL/L (ref 3.5–5.1)
PROT SERPL-MCNC: 6.4 G/DL (ref 6–8.4)
SODIUM SERPL-SCNC: 139 MMOL/L (ref 136–145)
TRIGL SERPL-MCNC: 360 MG/DL (ref 30–150)

## 2019-12-11 RX ORDER — CLOPIDOGREL BISULFATE 75 MG/1
75 TABLET ORAL DAILY
Qty: 90 TABLET | Refills: 1 | Status: SHIPPED | OUTPATIENT
Start: 2019-12-11 | End: 2020-05-22

## 2019-12-11 RX ORDER — LISINOPRIL 2.5 MG/1
2.5 TABLET ORAL DAILY
Qty: 90 TABLET | Refills: 1 | Status: SHIPPED | OUTPATIENT
Start: 2019-12-11 | End: 2020-05-22

## 2019-12-11 NOTE — TELEPHONE ENCOUNTER
----- Message from Mira Lyons sent at 12/11/2019  8:29 AM CST -----  Type: Needs Medical Advice    Who Called:  Benitajoyce Gibson - spouse  Pharmacy name and phone #:    [id number] - [address] [address]  Phone: [phone] Fax: [phone]  Best Call Back Number: [phone]   Additional Information: caller states pharmacy  Did not received the prescriptions forclopidogrel (PLAVIX) 75 mg tablet,  lisinopril (PRINIVIL,ZESTRIL) 2.5 MG tablet printed on 12/09/19, she is requesting resent to the pharmacy, she also states a prescription for Nicotine patches was to be sent, please contact to advise.

## 2019-12-12 RX ORDER — ATORVASTATIN CALCIUM 40 MG/1
40 TABLET, FILM COATED ORAL DAILY
Qty: 30 TABLET | Refills: 1 | Status: ON HOLD | OUTPATIENT
Start: 2019-12-12 | End: 2020-06-22

## 2020-03-04 RX ORDER — METFORMIN HYDROCHLORIDE 500 MG/1
TABLET, EXTENDED RELEASE ORAL
Qty: 60 TABLET | Refills: 0 | OUTPATIENT
Start: 2020-03-04

## 2020-03-12 ENCOUNTER — TELEPHONE (OUTPATIENT)
Dept: CARDIOLOGY | Facility: CLINIC | Age: 55
End: 2020-03-12

## 2020-03-12 DIAGNOSIS — Z95.818 STATUS POST PLACEMENT OF IMPLANTABLE LOOP RECORDER: Primary | ICD-10-CM

## 2020-03-12 DIAGNOSIS — I49.9 CARDIAC ARRHYTHMIA, UNSPECIFIED CARDIAC ARRHYTHMIA TYPE: ICD-10-CM

## 2020-03-12 NOTE — TELEPHONE ENCOUNTER
Home monitor disconnected, missed previous apt.  New apt mailed to address listed, for device check.

## 2020-04-01 ENCOUNTER — TELEPHONE (OUTPATIENT)
Dept: CARDIOLOGY | Facility: CLINIC | Age: 55
End: 2020-04-01

## 2020-04-01 NOTE — TELEPHONE ENCOUNTER
Attempted to contact pt to reschedule ILR check 4/7/2020, no answer, no voicemail available to leave message at [phone] and [phone].  Will reschedule apt same day as Dr Clive aylaa on 6/8/2020.  Apt will be mailed to current address [address] [place] [address].

## 2020-05-22 RX ORDER — LISINOPRIL 2.5 MG/1
TABLET ORAL
Qty: 90 TABLET | Refills: 0 | Status: SHIPPED | OUTPATIENT
Start: 2020-05-22 | End: 2020-08-24

## 2020-05-22 RX ORDER — CLOPIDOGREL BISULFATE 75 MG/1
TABLET ORAL
Qty: 90 TABLET | Refills: 0 | Status: SHIPPED | OUTPATIENT
Start: 2020-05-22 | End: 2020-08-24

## 2020-06-19 PROBLEM — I46.9 CARDIAC ARREST: Status: ACTIVE | Noted: 2020-06-19

## 2020-06-19 PROBLEM — N18.30 STAGE 3 CHRONIC KIDNEY DISEASE: Status: ACTIVE | Noted: 2020-06-19

## 2020-06-19 PROBLEM — I49.01 VENTRICULAR FIBRILLATION: Status: ACTIVE | Noted: 2020-06-19

## 2020-06-19 PROBLEM — R80.9 PROTEINURIA: Status: ACTIVE | Noted: 2020-06-19

## 2020-06-20 PROBLEM — R55 SYNCOPE AND COLLAPSE: Status: ACTIVE | Noted: 2020-06-20

## 2020-06-22 ENCOUNTER — TELEPHONE (OUTPATIENT)
Dept: CARDIOLOGY | Facility: CLINIC | Age: 55
End: 2020-06-22

## 2020-06-22 DIAGNOSIS — I50.22 CHRONIC SYSTOLIC HEART FAILURE: Primary | ICD-10-CM

## 2020-06-22 DIAGNOSIS — Z95.810 HISTORY OF AUTOMATIC INTERNAL CARDIAC DEFIBRILLATOR (AICD): ICD-10-CM

## 2020-06-23 ENCOUNTER — TELEPHONE (OUTPATIENT)
Dept: CARDIOLOGY | Facility: CLINIC | Age: 55
End: 2020-06-23

## 2020-06-23 NOTE — TELEPHONE ENCOUNTER
Spoke with pt wife. States that pt is in a lot of pain. was told in the hospital that a pain med would be called in but they have not heard anything. Please advise.

## 2020-06-23 NOTE — TELEPHONE ENCOUNTER
----- Message from Tamica Billings MA sent at 6/23/2020  3:52 PM CDT -----  Type: Needs Medical Advice  Who Called:  Benita - spouse  Best Call Back Number:   Additional Information: patient's wife is calling to check the status of an earlier appointment. It would be a [facility] f/u.  She states it should be within the next week.    She is also asking for some type of pain medication to be called in for him.    Please call to discuss

## 2020-07-01 ENCOUNTER — CLINICAL SUPPORT (OUTPATIENT)
Dept: CARDIOLOGY | Facility: CLINIC | Age: 55
End: 2020-07-01
Attending: INTERNAL MEDICINE
Payer: MEDICARE

## 2020-07-01 DIAGNOSIS — Z95.810 HISTORY OF AUTOMATIC INTERNAL CARDIAC DEFIBRILLATOR (AICD): ICD-10-CM

## 2020-07-01 DIAGNOSIS — I50.22 CHRONIC SYSTOLIC HEART FAILURE: ICD-10-CM

## 2020-07-01 PROCEDURE — 93282 CARDIAC DEVICE CHECK - IN CLINIC & HOSPITAL: ICD-10-PCS | Mod: 26,S$PBB,, | Performed by: INTERNAL MEDICINE

## 2020-07-01 PROCEDURE — 93282 PRGRMG EVAL IMPLANTABLE DFB: CPT | Mod: PBBFAC,PO | Performed by: INTERNAL MEDICINE

## 2020-07-06 ENCOUNTER — OFFICE VISIT (OUTPATIENT)
Dept: CARDIOLOGY | Facility: CLINIC | Age: 55
End: 2020-07-06
Payer: MEDICARE

## 2020-07-06 VITALS
DIASTOLIC BLOOD PRESSURE: 78 MMHG | HEIGHT: 68 IN | HEART RATE: 67 BPM | WEIGHT: 238.13 LBS | SYSTOLIC BLOOD PRESSURE: 162 MMHG | BODY MASS INDEX: 36.09 KG/M2

## 2020-07-06 DIAGNOSIS — I49.01 VENTRICULAR FIBRILLATION: ICD-10-CM

## 2020-07-06 DIAGNOSIS — E11.65 TYPE 2 DIABETES MELLITUS WITH HYPERGLYCEMIA, WITH LONG-TERM CURRENT USE OF INSULIN: ICD-10-CM

## 2020-07-06 DIAGNOSIS — Z79.4 TYPE 2 DIABETES MELLITUS WITH HYPERGLYCEMIA, WITH LONG-TERM CURRENT USE OF INSULIN: ICD-10-CM

## 2020-07-06 DIAGNOSIS — I25.708 CORONARY ARTERY DISEASE OF BYPASS GRAFT OF NATIVE HEART WITH STABLE ANGINA PECTORIS: ICD-10-CM

## 2020-07-06 DIAGNOSIS — I10 ESSENTIAL HYPERTENSION: ICD-10-CM

## 2020-07-06 DIAGNOSIS — Z95.810 S/P ICD (INTERNAL CARDIAC DEFIBRILLATOR) PROCEDURE: Primary | ICD-10-CM

## 2020-07-06 PROCEDURE — 99999 PR PBB SHADOW E&M-EST. PATIENT-LVL III: ICD-10-PCS | Mod: PBBFAC,,, | Performed by: INTERNAL MEDICINE

## 2020-07-06 PROCEDURE — 99024 POSTOP FOLLOW-UP VISIT: CPT | Mod: S$PBB,,, | Performed by: INTERNAL MEDICINE

## 2020-07-06 PROCEDURE — 99999 PR PBB SHADOW E&M-EST. PATIENT-LVL III: CPT | Mod: PBBFAC,,, | Performed by: INTERNAL MEDICINE

## 2020-07-06 PROCEDURE — 99024 PR POST-OP FOLLOW-UP VISIT: ICD-10-PCS | Mod: S$PBB,,, | Performed by: INTERNAL MEDICINE

## 2020-07-06 PROCEDURE — 99213 OFFICE O/P EST LOW 20 MIN: CPT | Mod: PBBFAC,PO | Performed by: INTERNAL MEDICINE

## 2020-07-06 RX ORDER — AMIODARONE HYDROCHLORIDE 200 MG/1
200 TABLET ORAL DAILY
Qty: 90 TABLET | Refills: 3 | Status: SHIPPED | OUTPATIENT
Start: 2020-07-06 | End: 2021-06-21

## 2020-07-06 RX ORDER — FUROSEMIDE 20 MG/1
20 TABLET ORAL
Qty: 30 TABLET | Refills: 3 | Status: SHIPPED | OUTPATIENT
Start: 2020-07-06 | End: 2020-11-02

## 2020-07-06 RX ORDER — SPIRONOLACTONE 25 MG/1
12.5 TABLET ORAL DAILY
Qty: 45 TABLET | Refills: 3 | Status: SHIPPED | OUTPATIENT
Start: 2020-07-06 | End: 2020-07-06

## 2020-07-06 NOTE — PROGRESS NOTES
Subjective:    Patient ID:  Ismael Gibson is a 54 y.o. male who presents for follow-up of CAD F/U, Medication Refill, and Edema      HPI  Admitted to [facility] last month with cardiac arrest, VF documented in ILR.  Angiogram showed no disease progression. Had ICD + amiodarone  He comes for follow up with no major problems, no chest pain, no shortness of breath.  Blood sugars better    Review of Systems   Constitution: Negative for decreased appetite, malaise/fatigue, weight gain and weight loss.   Cardiovascular: Negative for chest pain, dyspnea on exertion, leg swelling, palpitations and syncope.   Respiratory: Negative for cough and shortness of breath.    Gastrointestinal: Negative.    Neurological: Negative for weakness.   All other systems reviewed and are negative.       Objective:      Physical Exam   Constitutional: He is oriented to person, place, and time. He appears well-developed and well-nourished.   HENT:   Head: Normocephalic.   Eyes: Pupils are equal, round, and reactive to light.   Neck: Normal range of motion. Neck supple. No JVD present. Carotid bruit is not present. No thyromegaly present.   Cardiovascular: Normal rate, regular rhythm, normal heart sounds, intact distal pulses and normal pulses. PMI is not displaced. Exam reveals no gallop.   No murmur heard.  Pulmonary/Chest: Effort normal and breath sounds normal.   Abdominal: Soft. Normal appearance. He exhibits no mass. There is no hepatosplenomegaly. There is no abdominal tenderness.   Musculoskeletal: Normal range of motion.         General: No edema.   Neurological: He is alert and oriented to person, place, and time. He has normal strength and normal reflexes. No sensory deficit.   Skin: Skin is warm and intact.   Psychiatric: He has a normal mood and affect.   Nursing note and vitals reviewed.        Assessment:       1. S/P ICD (internal cardiac defibrillator) procedure    2. Coronary artery disease of bypass graft of native heart with  stable angina pectoris    3. Ventricular fibrillation    4. Type 2 diabetes mellitus with hyperglycemia, with long-term current use of insulin    5. Essential hypertension         Plan:   Decrease amiodarone to 200 qd  Add lasix prn  No aldactone due to CKD  Continue all other cardiac medications  Regular exercise program  Weight loss  6 m f/u

## 2020-09-07 ENCOUNTER — CLINICAL SUPPORT (OUTPATIENT)
Dept: CARDIOLOGY | Facility: CLINIC | Age: 55
End: 2020-09-07
Payer: MEDICARE

## 2020-09-07 DIAGNOSIS — Z95.810 PRESENCE OF AUTOMATIC (IMPLANTABLE) CARDIAC DEFIBRILLATOR: ICD-10-CM

## 2020-09-07 PROCEDURE — G2066 INTER DEVC REMOTE 30D: HCPCS | Mod: PBBFAC,PO | Performed by: INTERNAL MEDICINE

## 2020-09-07 PROCEDURE — 93297 CARDIAC DEVICE CHECK - REMOTE: ICD-10-PCS | Mod: ,,, | Performed by: INTERNAL MEDICINE

## 2020-09-07 PROCEDURE — 93297 REM INTERROG DEV EVAL ICPMS: CPT | Mod: ,,, | Performed by: INTERNAL MEDICINE

## 2020-09-22 ENCOUNTER — CLINICAL SUPPORT (OUTPATIENT)
Dept: CARDIOLOGY | Facility: CLINIC | Age: 55
End: 2020-09-22
Payer: MEDICARE

## 2020-09-22 DIAGNOSIS — Z95.810 PRESENCE OF AUTOMATIC (IMPLANTABLE) CARDIAC DEFIBRILLATOR: ICD-10-CM

## 2020-09-22 PROCEDURE — 93295 CARDIAC DEVICE CHECK - REMOTE: ICD-10-PCS | Mod: ,,, | Performed by: INTERNAL MEDICINE

## 2020-09-22 PROCEDURE — 93295 DEV INTERROG REMOTE 1/2/MLT: CPT | Mod: ,,, | Performed by: INTERNAL MEDICINE

## 2020-09-22 PROCEDURE — 93296 REM INTERROG EVL PM/IDS: CPT | Mod: PBBFAC,PO | Performed by: INTERNAL MEDICINE

## 2020-10-23 ENCOUNTER — TELEPHONE (OUTPATIENT)
Dept: CARDIOLOGY | Facility: CLINIC | Age: 55
End: 2020-10-23

## 2020-11-02 RX ORDER — FUROSEMIDE 20 MG/1
TABLET ORAL
Qty: 90 TABLET | Refills: 1 | Status: SHIPPED | OUTPATIENT
Start: 2020-11-02 | End: 2022-07-25 | Stop reason: CLARIF

## 2020-11-24 ENCOUNTER — TELEPHONE (OUTPATIENT)
Dept: CARDIOLOGY | Facility: CLINIC | Age: 55
End: 2020-11-24

## 2020-12-07 ENCOUNTER — CLINICAL SUPPORT (OUTPATIENT)
Dept: CARDIOLOGY | Facility: CLINIC | Age: 55
End: 2020-12-07
Payer: MEDICARE

## 2020-12-07 DIAGNOSIS — Z95.810 PRESENCE OF AUTOMATIC (IMPLANTABLE) CARDIAC DEFIBRILLATOR: ICD-10-CM

## 2020-12-07 PROCEDURE — 93297 CARDIAC DEVICE CHECK - REMOTE: ICD-10-PCS | Mod: ,,, | Performed by: INTERNAL MEDICINE

## 2020-12-07 PROCEDURE — 93297 REM INTERROG DEV EVAL ICPMS: CPT | Mod: ,,, | Performed by: INTERNAL MEDICINE

## 2020-12-21 ENCOUNTER — CLINICAL SUPPORT (OUTPATIENT)
Dept: CARDIOLOGY | Facility: CLINIC | Age: 55
End: 2020-12-21
Payer: MEDICARE

## 2020-12-21 DIAGNOSIS — Z95.810 PRESENCE OF AUTOMATIC (IMPLANTABLE) CARDIAC DEFIBRILLATOR: ICD-10-CM

## 2020-12-21 PROCEDURE — 93296 REM INTERROG EVL PM/IDS: CPT | Mod: PBBFAC,PO | Performed by: INTERNAL MEDICINE

## 2020-12-21 PROCEDURE — 93295 DEV INTERROG REMOTE 1/2/MLT: CPT | Mod: ,,, | Performed by: INTERNAL MEDICINE

## 2020-12-21 PROCEDURE — 93295 CARDIAC DEVICE CHECK - REMOTE: ICD-10-PCS | Mod: ,,, | Performed by: INTERNAL MEDICINE

## 2021-01-06 ENCOUNTER — CLINICAL SUPPORT (OUTPATIENT)
Dept: CARDIOLOGY | Facility: CLINIC | Age: 56
End: 2021-01-06
Payer: MEDICARE

## 2021-01-06 DIAGNOSIS — Z95.810 PRESENCE OF AUTOMATIC (IMPLANTABLE) CARDIAC DEFIBRILLATOR: ICD-10-CM

## 2021-01-06 PROCEDURE — 93297 REM INTERROG DEV EVAL ICPMS: CPT | Mod: S$GLB,,, | Performed by: INTERNAL MEDICINE

## 2021-01-06 PROCEDURE — 93297 CARDIAC DEVICE CHECK - REMOTE: ICD-10-PCS | Mod: S$GLB,,, | Performed by: INTERNAL MEDICINE

## 2021-01-12 ENCOUNTER — TELEPHONE (OUTPATIENT)
Dept: CARDIOLOGY | Facility: CLINIC | Age: 56
End: 2021-01-12

## 2021-02-02 DIAGNOSIS — I25.708 CORONARY ARTERY DISEASE OF BYPASS GRAFT OF NATIVE HEART WITH STABLE ANGINA PECTORIS: Primary | ICD-10-CM

## 2021-02-02 DIAGNOSIS — I10 ESSENTIAL HYPERTENSION: Primary | ICD-10-CM

## 2021-02-02 RX ORDER — LISINOPRIL 2.5 MG/1
2.5 TABLET ORAL DAILY
Qty: 90 TABLET | Refills: 0 | Status: SHIPPED | OUTPATIENT
Start: 2021-02-02 | End: 2021-04-20

## 2021-02-02 RX ORDER — CLOPIDOGREL BISULFATE 75 MG/1
75 TABLET ORAL DAILY
Qty: 90 TABLET | Refills: 0 | Status: SHIPPED | OUTPATIENT
Start: 2021-02-02 | End: 2021-04-20

## 2021-02-03 ENCOUNTER — TELEPHONE (OUTPATIENT)
Dept: CARDIOLOGY | Facility: CLINIC | Age: 56
End: 2021-02-03

## 2021-03-07 ENCOUNTER — CLINICAL SUPPORT (OUTPATIENT)
Dept: CARDIOLOGY | Facility: CLINIC | Age: 56
End: 2021-03-07
Payer: MEDICARE

## 2021-03-16 ENCOUNTER — TELEPHONE (OUTPATIENT)
Dept: CARDIOLOGY | Facility: CLINIC | Age: 56
End: 2021-03-16

## 2021-03-21 ENCOUNTER — CLINICAL SUPPORT (OUTPATIENT)
Dept: CARDIOLOGY | Facility: CLINIC | Age: 56
End: 2021-03-21
Payer: MEDICARE

## 2021-03-21 DIAGNOSIS — Z95.810 PRESENCE OF AUTOMATIC (IMPLANTABLE) CARDIAC DEFIBRILLATOR: ICD-10-CM

## 2021-03-21 PROCEDURE — 93296 REM INTERROG EVL PM/IDS: CPT | Mod: S$GLB,,, | Performed by: INTERNAL MEDICINE

## 2021-03-21 PROCEDURE — 93296 CARDIAC DEVICE CHECK - REMOTE: ICD-10-PCS | Mod: S$GLB,,, | Performed by: INTERNAL MEDICINE

## 2021-03-21 PROCEDURE — 93295 DEV INTERROG REMOTE 1/2/MLT: CPT | Mod: S$GLB,,, | Performed by: INTERNAL MEDICINE

## 2021-03-21 PROCEDURE — 93295 CARDIAC DEVICE CHECK - REMOTE: ICD-10-PCS | Mod: S$GLB,,, | Performed by: INTERNAL MEDICINE

## 2021-04-19 ENCOUNTER — TELEPHONE (OUTPATIENT)
Dept: CARDIOLOGY | Facility: CLINIC | Age: 56
End: 2021-04-19

## 2021-05-21 ENCOUNTER — TELEPHONE (OUTPATIENT)
Dept: CARDIOLOGY | Facility: HOSPITAL | Age: 56
End: 2021-05-21

## 2021-06-07 ENCOUNTER — HOSPITAL ENCOUNTER (OUTPATIENT)
Dept: CARDIOLOGY | Facility: HOSPITAL | Age: 56
Discharge: HOME | End: 2021-06-07
Payer: MEDICARE

## 2021-06-07 ENCOUNTER — CLINICAL SUPPORT (OUTPATIENT)
Dept: CARDIOLOGY | Facility: HOSPITAL | Age: 56
End: 2021-06-07
Payer: MEDICARE

## 2021-06-07 DIAGNOSIS — Z95.810 PRESENCE OF AUTOMATIC (IMPLANTABLE) CARDIAC DEFIBRILLATOR: ICD-10-CM

## 2021-06-07 PROCEDURE — 93297 REM INTERROG DEV EVAL ICPMS: CPT | Mod: ,,, | Performed by: INTERNAL MEDICINE

## 2021-06-07 PROCEDURE — 93297 CARDIAC DEVICE CHECK - REMOTE: ICD-10-PCS | Mod: ,,, | Performed by: INTERNAL MEDICINE

## 2021-06-07 PROCEDURE — G2066 INTER DEVC REMOTE 30D: HCPCS | Mod: PO | Performed by: INTERNAL MEDICINE

## 2021-06-19 ENCOUNTER — HOSPITAL ENCOUNTER (OUTPATIENT)
Dept: CARDIOLOGY | Facility: HOSPITAL | Age: 56
Discharge: HOME | End: 2021-06-19
Payer: MEDICARE

## 2021-06-19 ENCOUNTER — CLINICAL SUPPORT (OUTPATIENT)
Dept: CARDIOLOGY | Facility: HOSPITAL | Age: 56
End: 2021-06-19
Payer: MEDICARE

## 2021-06-19 DIAGNOSIS — Z95.810 PRESENCE OF AUTOMATIC (IMPLANTABLE) CARDIAC DEFIBRILLATOR: ICD-10-CM

## 2021-06-19 PROCEDURE — 93295 DEV INTERROG REMOTE 1/2/MLT: CPT | Mod: ,,, | Performed by: INTERNAL MEDICINE

## 2021-06-19 PROCEDURE — 93296 REM INTERROG EVL PM/IDS: CPT | Mod: PO | Performed by: INTERNAL MEDICINE

## 2021-06-19 PROCEDURE — 93295 CARDIAC DEVICE CHECK - REMOTE: ICD-10-PCS | Mod: ,,, | Performed by: INTERNAL MEDICINE

## 2021-07-07 ENCOUNTER — HOSPITAL ENCOUNTER (OUTPATIENT)
Dept: CARDIOLOGY | Facility: HOSPITAL | Age: 56
Discharge: HOME | End: 2021-07-07
Payer: MEDICARE

## 2021-07-07 ENCOUNTER — CLINICAL SUPPORT (OUTPATIENT)
Dept: CARDIOLOGY | Facility: HOSPITAL | Age: 56
End: 2021-07-07
Payer: MEDICARE

## 2021-07-07 ENCOUNTER — TELEPHONE (OUTPATIENT)
Dept: CARDIOLOGY | Facility: CLINIC | Age: 56
End: 2021-07-07

## 2021-07-07 DIAGNOSIS — Z95.810 PRESENCE OF AUTOMATIC (IMPLANTABLE) CARDIAC DEFIBRILLATOR: ICD-10-CM

## 2021-07-07 PROCEDURE — G2066 INTER DEVC REMOTE 30D: HCPCS | Mod: PO | Performed by: INTERNAL MEDICINE

## 2021-07-07 PROCEDURE — 93297 REM INTERROG DEV EVAL ICPMS: CPT | Mod: ,,, | Performed by: INTERNAL MEDICINE

## 2021-07-07 PROCEDURE — 93297 CARDIAC DEVICE CHECK - REMOTE: ICD-10-PCS | Mod: ,,, | Performed by: INTERNAL MEDICINE

## 2021-09-06 ENCOUNTER — CLINICAL SUPPORT (OUTPATIENT)
Dept: CARDIOLOGY | Facility: HOSPITAL | Age: 56
End: 2021-09-06
Payer: MEDICARE

## 2021-09-06 DIAGNOSIS — Z95.810 PRESENCE OF AUTOMATIC (IMPLANTABLE) CARDIAC DEFIBRILLATOR: ICD-10-CM

## 2021-09-06 PROCEDURE — 93297 CARDIAC DEVICE CHECK - REMOTE: ICD-10-PCS | Mod: ,,, | Performed by: INTERNAL MEDICINE

## 2021-09-06 PROCEDURE — 93297 REM INTERROG DEV EVAL ICPMS: CPT | Mod: ,,, | Performed by: INTERNAL MEDICINE

## 2021-09-06 PROCEDURE — G2066 INTER DEVC REMOTE 30D: HCPCS | Mod: PO | Performed by: INTERNAL MEDICINE

## 2021-09-14 ENCOUNTER — TELEPHONE (OUTPATIENT)
Dept: CARDIOLOGY | Facility: HOSPITAL | Age: 56
End: 2021-09-14

## 2021-09-14 DIAGNOSIS — Z95.810 PRESENCE OF AUTOMATIC (IMPLANTABLE) CARDIAC DEFIBRILLATOR: Primary | ICD-10-CM

## 2021-09-14 DIAGNOSIS — I49.01 VENTRICULAR FIBRILLATION: ICD-10-CM

## 2021-09-17 ENCOUNTER — CLINICAL SUPPORT (OUTPATIENT)
Dept: CARDIOLOGY | Facility: HOSPITAL | Age: 56
End: 2021-09-17
Payer: MEDICARE

## 2021-09-17 DIAGNOSIS — Z95.810 PRESENCE OF AUTOMATIC (IMPLANTABLE) CARDIAC DEFIBRILLATOR: ICD-10-CM

## 2021-09-17 PROCEDURE — 93295 DEV INTERROG REMOTE 1/2/MLT: CPT | Mod: ,,, | Performed by: INTERNAL MEDICINE

## 2021-09-17 PROCEDURE — 93296 REM INTERROG EVL PM/IDS: CPT | Mod: PO | Performed by: INTERNAL MEDICINE

## 2021-09-17 PROCEDURE — 93295 CARDIAC DEVICE CHECK - REMOTE: ICD-10-PCS | Mod: ,,, | Performed by: INTERNAL MEDICINE

## 2021-12-05 ENCOUNTER — CLINICAL SUPPORT (OUTPATIENT)
Dept: CARDIOLOGY | Facility: HOSPITAL | Age: 56
End: 2021-12-05
Payer: MEDICARE

## 2021-12-05 DIAGNOSIS — Z95.810 PRESENCE OF AUTOMATIC (IMPLANTABLE) CARDIAC DEFIBRILLATOR: ICD-10-CM

## 2021-12-05 PROCEDURE — 93297 CARDIAC DEVICE CHECK - REMOTE: ICD-10-PCS | Mod: ,,, | Performed by: INTERNAL MEDICINE

## 2021-12-05 PROCEDURE — 93297 REM INTERROG DEV EVAL ICPMS: CPT | Mod: ,,, | Performed by: INTERNAL MEDICINE

## 2021-12-05 PROCEDURE — G2066 INTER DEVC REMOTE 30D: HCPCS | Mod: PO | Performed by: INTERNAL MEDICINE

## 2021-12-16 ENCOUNTER — CLINICAL SUPPORT (OUTPATIENT)
Dept: CARDIOLOGY | Facility: HOSPITAL | Age: 56
End: 2021-12-16
Payer: MEDICARE

## 2021-12-16 DIAGNOSIS — Z95.810 PRESENCE OF AUTOMATIC (IMPLANTABLE) CARDIAC DEFIBRILLATOR: ICD-10-CM

## 2021-12-16 PROCEDURE — 93295 CARDIAC DEVICE CHECK - REMOTE: ICD-10-PCS | Mod: ,,, | Performed by: INTERNAL MEDICINE

## 2021-12-16 PROCEDURE — 93296 REM INTERROG EVL PM/IDS: CPT | Mod: PO | Performed by: INTERNAL MEDICINE

## 2021-12-16 PROCEDURE — 93295 DEV INTERROG REMOTE 1/2/MLT: CPT | Mod: ,,, | Performed by: INTERNAL MEDICINE

## 2022-01-03 NOTE — TELEPHONE ENCOUNTER
----- Message from Leydi Young sent at 10/15/2019  2:43 PM CDT -----  Type:  RX Refill Request    Who Called:  Wife- Benita   Refill or New Rx: refill   RX Name and Strength: rx lisinopril 2.5 mg, rx plavix 75 mg   How is the patient currently taking it? (ex. 1XDay):  Is this a 30 day or 90 day RX:  30 day supply Preferred Pharm[facility] with phone number[facility] [place]  Local or Mail Order:  Local   Ordering Provider:  Dr Duffy   [facility] Call Back Number:  [phone]   Additional Information:      
5

## 2022-01-11 ENCOUNTER — TELEPHONE (OUTPATIENT)
Dept: CARDIOLOGY | Facility: HOSPITAL | Age: 57
End: 2022-01-11
Payer: MEDICARE

## 2022-01-11 RX ORDER — CARVEDILOL 12.5 MG/1
12.5 TABLET ORAL 2 TIMES DAILY WITH MEALS
Qty: 180 TABLET | Refills: 4 | Status: SHIPPED | OUTPATIENT
Start: 2022-01-11 | End: 2022-07-25 | Stop reason: CLARIF

## 2022-01-11 NOTE — TELEPHONE ENCOUNTER
Spoke to wife to notify of Dr. Marks's orders to increase Coreg to 12.5 mg bid and f/u in 1 month. Appt. Scheduled on 2/17/22 @ 3 pm for device interrogation and Dr. Marks 4pm. Prescription sent to [facility] per request. Discussed ED precautions

## 2022-01-11 NOTE — TELEPHONE ENCOUNTER
Device Type:  ICD    Date/Time:  1/11/2022 @ 13:37pm    Event Type:  MMVT    Ventricular CL:  314 ms    Duration:  22 secs    Therapy Delivered:  ATP x 2     Appropriate Therapy:  Yes    Successful:  Type II break after ATP x 2    Pt Symptomatic:  Yes, he was laying down and reports he was dizzy, felt like his legs feel asleep and he got nauseous. He reports he currently feels fine      Meds:  Amiodarone 200 mg daily  Coreg 6.25 mg bid    Last EF-6/19/20 30 %

## 2022-02-17 ENCOUNTER — CLINICAL SUPPORT (OUTPATIENT)
Dept: CARDIOLOGY | Facility: HOSPITAL | Age: 57
End: 2022-02-17
Attending: INTERNAL MEDICINE
Payer: MEDICARE

## 2022-02-17 ENCOUNTER — OFFICE VISIT (OUTPATIENT)
Dept: CARDIOLOGY | Facility: CLINIC | Age: 57
End: 2022-02-17
Payer: MEDICARE

## 2022-02-17 VITALS
SYSTOLIC BLOOD PRESSURE: 155 MMHG | DIASTOLIC BLOOD PRESSURE: 78 MMHG | HEIGHT: 68 IN | BODY MASS INDEX: 33.78 KG/M2 | HEART RATE: 57 BPM | WEIGHT: 222.88 LBS

## 2022-02-17 DIAGNOSIS — I49.01 VENTRICULAR FIBRILLATION: ICD-10-CM

## 2022-02-17 DIAGNOSIS — Z95.810 PRESENCE OF AUTOMATIC (IMPLANTABLE) CARDIAC DEFIBRILLATOR: ICD-10-CM

## 2022-02-17 DIAGNOSIS — I10 PRIMARY HYPERTENSION: ICD-10-CM

## 2022-02-17 DIAGNOSIS — E11.65 TYPE 2 DIABETES MELLITUS WITH HYPERGLYCEMIA, WITH LONG-TERM CURRENT USE OF INSULIN: ICD-10-CM

## 2022-02-17 DIAGNOSIS — I25.708 CORONARY ARTERY DISEASE OF BYPASS GRAFT OF NATIVE HEART WITH STABLE ANGINA PECTORIS: Primary | ICD-10-CM

## 2022-02-17 DIAGNOSIS — Z79.4 TYPE 2 DIABETES MELLITUS WITH HYPERGLYCEMIA, WITH LONG-TERM CURRENT USE OF INSULIN: ICD-10-CM

## 2022-02-17 DIAGNOSIS — Z95.810 S/P ICD (INTERNAL CARDIAC DEFIBRILLATOR) PROCEDURE: ICD-10-CM

## 2022-02-17 DIAGNOSIS — I46.9 CARDIAC ARREST: ICD-10-CM

## 2022-02-17 PROCEDURE — 99999 PR PBB SHADOW E&M-EST. PATIENT-LVL III: ICD-10-PCS | Mod: PBBFAC,,, | Performed by: INTERNAL MEDICINE

## 2022-02-17 PROCEDURE — 99214 PR OFFICE/OUTPT VISIT, EST, LEVL IV, 30-39 MIN: ICD-10-PCS | Mod: S$GLB,,, | Performed by: INTERNAL MEDICINE

## 2022-02-17 PROCEDURE — 99999 PR PBB SHADOW E&M-EST. PATIENT-LVL III: CPT | Mod: PBBFAC,,, | Performed by: INTERNAL MEDICINE

## 2022-02-17 PROCEDURE — 1160F RVW MEDS BY RX/DR IN RCRD: CPT | Mod: CPTII,S$GLB,, | Performed by: INTERNAL MEDICINE

## 2022-02-17 PROCEDURE — 3077F PR MOST RECENT SYSTOLIC BLOOD PRESSURE >= 140 MM HG: ICD-10-PCS | Mod: CPTII,S$GLB,, | Performed by: INTERNAL MEDICINE

## 2022-02-17 PROCEDURE — 4010F PR ACE/ARB THEARPY RXD/TAKEN: ICD-10-PCS | Mod: CPTII,S$GLB,, | Performed by: INTERNAL MEDICINE

## 2022-02-17 PROCEDURE — 3078F PR MOST RECENT DIASTOLIC BLOOD PRESSURE < 80 MM HG: ICD-10-PCS | Mod: CPTII,S$GLB,, | Performed by: INTERNAL MEDICINE

## 2022-02-17 PROCEDURE — 3077F SYST BP >= 140 MM HG: CPT | Mod: CPTII,S$GLB,, | Performed by: INTERNAL MEDICINE

## 2022-02-17 PROCEDURE — 93282 PRGRMG EVAL IMPLANTABLE DFB: CPT | Mod: 26,,, | Performed by: INTERNAL MEDICINE

## 2022-02-17 PROCEDURE — 1160F PR REVIEW ALL MEDS BY PRESCRIBER/CLIN PHARMACIST DOCUMENTED: ICD-10-PCS | Mod: CPTII,S$GLB,, | Performed by: INTERNAL MEDICINE

## 2022-02-17 PROCEDURE — 1159F PR MEDICATION LIST DOCUMENTED IN MEDICAL RECORD: ICD-10-PCS | Mod: CPTII,S$GLB,, | Performed by: INTERNAL MEDICINE

## 2022-02-17 PROCEDURE — 3008F PR BODY MASS INDEX (BMI) DOCUMENTED: ICD-10-PCS | Mod: CPTII,S$GLB,, | Performed by: INTERNAL MEDICINE

## 2022-02-17 PROCEDURE — 3078F DIAST BP <80 MM HG: CPT | Mod: CPTII,S$GLB,, | Performed by: INTERNAL MEDICINE

## 2022-02-17 PROCEDURE — 3008F BODY MASS INDEX DOCD: CPT | Mod: CPTII,S$GLB,, | Performed by: INTERNAL MEDICINE

## 2022-02-17 PROCEDURE — 4010F ACE/ARB THERAPY RXD/TAKEN: CPT | Mod: CPTII,S$GLB,, | Performed by: INTERNAL MEDICINE

## 2022-02-17 PROCEDURE — 99214 OFFICE O/P EST MOD 30 MIN: CPT | Mod: S$GLB,,, | Performed by: INTERNAL MEDICINE

## 2022-02-17 PROCEDURE — 1159F MED LIST DOCD IN RCRD: CPT | Mod: CPTII,S$GLB,, | Performed by: INTERNAL MEDICINE

## 2022-02-17 PROCEDURE — 93282 CARDIAC DEVICE CHECK - IN CLINIC & HOSPITAL: ICD-10-PCS | Mod: 26,,, | Performed by: INTERNAL MEDICINE

## 2022-02-17 NOTE — PROGRESS NOTES
Subjective:    Patient ID:  Ismael Gibson Jr. is a 56 y.o. male who presents for follow-up of VT      HPI  He comes for follow up with no major problems, no chest pain, no shortness of breath.  Had ATPx2 last month (1/11), nothing after      Review of Systems   Constitutional: Negative for decreased appetite, malaise/fatigue, weight gain and weight loss.   Cardiovascular: Negative for chest pain, dyspnea on exertion, leg swelling, palpitations and syncope.   Respiratory: Negative for cough and shortness of breath.    Gastrointestinal: Negative.    Neurological: Negative for weakness.   All other systems reviewed and are negative.       Objective:      Physical Exam  Vitals and nursing note reviewed.   Constitutional:       Appearance: Normal appearance. He is well-developed and well-nourished.   HENT:      Head: Normocephalic.   Eyes:      Pupils: Pupils are equal, round, and reactive to light.   Neck:      Thyroid: No thyromegaly.      Vascular: No carotid bruit or JVD.   Cardiovascular:      Rate and Rhythm: Normal rate and regular rhythm.      Chest Wall: PMI is not displaced.      Pulses: Normal pulses and intact distal pulses.      Heart sounds: Normal heart sounds. No murmur heard.  No gallop.    Pulmonary:      Effort: Pulmonary effort is normal.      Breath sounds: Normal breath sounds.   Abdominal:      Palpations: Abdomen is soft. There is no hepatosplenomegaly or mass.      Tenderness: There is no abdominal tenderness.   Musculoskeletal:         General: No edema. Normal range of motion.      Cervical back: Normal range of motion and neck supple.   Skin:     General: Skin is warm and intact.   Neurological:      Mental Status: He is alert and oriented to person, place, and time.      Sensory: No sensory deficit.      Deep Tendon Reflexes: Strength normal and reflexes are normal and symmetric.   Psychiatric:         Mood and Affect: Mood and affect normal.           Assessment:       1. Coronary artery  disease of bypass graft of native heart with stable angina pectoris    2. Primary hypertension    3. S/P ICD (internal cardiac defibrillator) procedure    4. Cardiac arrest         Plan:   Labs today; call with results  Continue all cardiac medications  Regular exercise program  Weight loss  6 m f/u with waldo carbajal

## 2022-03-16 ENCOUNTER — CLINICAL SUPPORT (OUTPATIENT)
Dept: CARDIOLOGY | Facility: HOSPITAL | Age: 57
End: 2022-03-16
Payer: MEDICARE

## 2022-03-16 DIAGNOSIS — Z95.810 PRESENCE OF AUTOMATIC (IMPLANTABLE) CARDIAC DEFIBRILLATOR: ICD-10-CM

## 2022-03-16 PROCEDURE — 93296 REM INTERROG EVL PM/IDS: CPT | Mod: PO | Performed by: INTERNAL MEDICINE

## 2022-06-14 ENCOUNTER — CLINICAL SUPPORT (OUTPATIENT)
Dept: CARDIOLOGY | Facility: HOSPITAL | Age: 57
End: 2022-06-14
Payer: MEDICARE

## 2022-06-14 DIAGNOSIS — Z95.810 PRESENCE OF AUTOMATIC (IMPLANTABLE) CARDIAC DEFIBRILLATOR: ICD-10-CM

## 2022-06-14 PROCEDURE — 93295 CARDIAC DEVICE CHECK - REMOTE: ICD-10-PCS | Mod: ,,, | Performed by: INTERNAL MEDICINE

## 2022-06-14 PROCEDURE — 93296 REM INTERROG EVL PM/IDS: CPT | Mod: PO | Performed by: INTERNAL MEDICINE

## 2022-06-14 PROCEDURE — 93295 DEV INTERROG REMOTE 1/2/MLT: CPT | Mod: ,,, | Performed by: INTERNAL MEDICINE

## 2022-07-25 PROBLEM — Z71.89 ACP (ADVANCE CARE PLANNING): Status: RESOLVED | Noted: 2022-07-25 | Resolved: 2022-07-25

## 2022-07-25 PROBLEM — U07.1 COVID-19: Status: ACTIVE | Noted: 2022-07-25

## 2022-07-25 PROBLEM — N17.9 ARF (ACUTE RENAL FAILURE): Status: ACTIVE | Noted: 2022-07-25

## 2022-07-25 PROBLEM — Z71.89 ACP (ADVANCE CARE PLANNING): Status: ACTIVE | Noted: 2022-07-25

## 2022-07-26 PROBLEM — D64.9 ANEMIA: Status: ACTIVE | Noted: 2022-07-26

## 2022-07-28 PROBLEM — R78.81 GRAM-NEGATIVE BACTEREMIA: Status: ACTIVE | Noted: 2022-07-28

## 2022-07-30 PROBLEM — B96.5 BACTEREMIA DUE TO PSEUDOMONAS: Status: ACTIVE | Noted: 2022-07-28

## 2022-09-08 ENCOUNTER — OFFICE VISIT (OUTPATIENT)
Dept: INFECTIOUS DISEASES | Facility: CLINIC | Age: 57
End: 2022-09-08
Payer: MEDICARE

## 2022-09-08 ENCOUNTER — TELEPHONE (OUTPATIENT)
Dept: INFECTIOUS DISEASES | Facility: CLINIC | Age: 57
End: 2022-09-08
Payer: MEDICARE

## 2022-09-08 VITALS — BODY MASS INDEX: 30.31 KG/M2 | TEMPERATURE: 98 F | WEIGHT: 200 LBS | HEIGHT: 68 IN

## 2022-09-08 DIAGNOSIS — Z79.2 RECEIVING INTRAVENOUS ANTIBIOTIC TREATMENT AS OUTPATIENT: ICD-10-CM

## 2022-09-08 DIAGNOSIS — J15.1 PNEUMONIA OF BOTH LUNGS DUE TO PSEUDOMONAS SPECIES, UNSPECIFIED PART OF LUNG: ICD-10-CM

## 2022-09-08 DIAGNOSIS — A09 INFECTIOUS DIARRHEA: ICD-10-CM

## 2022-09-08 DIAGNOSIS — B96.5 BACTEREMIA DUE TO PSEUDOMONAS: Primary | ICD-10-CM

## 2022-09-08 DIAGNOSIS — B96.5 PSEUDOMONAL BACTEREMIA: ICD-10-CM

## 2022-09-08 DIAGNOSIS — R78.81 PSEUDOMONAL BACTEREMIA: ICD-10-CM

## 2022-09-08 DIAGNOSIS — R78.81 BACTEREMIA DUE TO PSEUDOMONAS: Primary | ICD-10-CM

## 2022-09-08 DIAGNOSIS — M86.9 OSTEOMYELITIS, UNSPECIFIED SITE, UNSPECIFIED TYPE: Primary | ICD-10-CM

## 2022-09-08 PROCEDURE — 1111F PR DISCHARGE MEDS RECONCILED W/ CURRENT OUTPATIENT MED LIST: ICD-10-PCS | Mod: CPTII,S$GLB,, | Performed by: INTERNAL MEDICINE

## 2022-09-08 PROCEDURE — 1159F PR MEDICATION LIST DOCUMENTED IN MEDICAL RECORD: ICD-10-PCS | Mod: CPTII,S$GLB,, | Performed by: INTERNAL MEDICINE

## 2022-09-08 PROCEDURE — 3066F NEPHROPATHY DOC TX: CPT | Mod: CPTII,S$GLB,, | Performed by: INTERNAL MEDICINE

## 2022-09-08 PROCEDURE — 3044F HG A1C LEVEL LT 7.0%: CPT | Mod: CPTII,S$GLB,, | Performed by: INTERNAL MEDICINE

## 2022-09-08 PROCEDURE — 3061F NEG MICROALBUMINURIA REV: CPT | Mod: CPTII,S$GLB,, | Performed by: INTERNAL MEDICINE

## 2022-09-08 PROCEDURE — 1159F MED LIST DOCD IN RCRD: CPT | Mod: CPTII,S$GLB,, | Performed by: INTERNAL MEDICINE

## 2022-09-08 PROCEDURE — 4010F ACE/ARB THERAPY RXD/TAKEN: CPT | Mod: CPTII,S$GLB,, | Performed by: INTERNAL MEDICINE

## 2022-09-08 PROCEDURE — 3061F PR NEG MICROALBUMINURIA RESULT DOCUMENTED/REVIEW: ICD-10-PCS | Mod: CPTII,S$GLB,, | Performed by: INTERNAL MEDICINE

## 2022-09-08 PROCEDURE — 99213 OFFICE O/P EST LOW 20 MIN: CPT | Mod: S$GLB,,, | Performed by: INTERNAL MEDICINE

## 2022-09-08 PROCEDURE — 99999 PR PBB SHADOW E&M-EST. PATIENT-LVL II: CPT | Mod: PBBFAC,,, | Performed by: INTERNAL MEDICINE

## 2022-09-08 PROCEDURE — 3044F PR MOST RECENT HEMOGLOBIN A1C LEVEL <7.0%: ICD-10-PCS | Mod: CPTII,S$GLB,, | Performed by: INTERNAL MEDICINE

## 2022-09-08 PROCEDURE — 3066F PR DOCUMENTATION OF TREATMENT FOR NEPHROPATHY: ICD-10-PCS | Mod: CPTII,S$GLB,, | Performed by: INTERNAL MEDICINE

## 2022-09-08 PROCEDURE — 4010F PR ACE/ARB THEARPY RXD/TAKEN: ICD-10-PCS | Mod: CPTII,S$GLB,, | Performed by: INTERNAL MEDICINE

## 2022-09-08 PROCEDURE — 99999 PR PBB SHADOW E&M-EST. PATIENT-LVL II: ICD-10-PCS | Mod: PBBFAC,,, | Performed by: INTERNAL MEDICINE

## 2022-09-08 PROCEDURE — 1160F PR REVIEW ALL MEDS BY PRESCRIBER/CLIN PHARMACIST DOCUMENTED: ICD-10-PCS | Mod: CPTII,S$GLB,, | Performed by: INTERNAL MEDICINE

## 2022-09-08 PROCEDURE — 99213 PR OFFICE/OUTPT VISIT, EST, LEVL III, 20-29 MIN: ICD-10-PCS | Mod: S$GLB,,, | Performed by: INTERNAL MEDICINE

## 2022-09-08 PROCEDURE — 1111F DSCHRG MED/CURRENT MED MERGE: CPT | Mod: CPTII,S$GLB,, | Performed by: INTERNAL MEDICINE

## 2022-09-08 PROCEDURE — 1160F RVW MEDS BY RX/DR IN RCRD: CPT | Mod: CPTII,S$GLB,, | Performed by: INTERNAL MEDICINE

## 2022-09-08 RX ORDER — DOXYCYCLINE HYCLATE 100 MG
100 TABLET ORAL DAILY
Qty: 10 TABLET | Refills: 0 | Status: SHIPPED | OUTPATIENT
Start: 2022-09-08 | End: 2022-09-08 | Stop reason: CLARIF

## 2022-09-08 NOTE — PROGRESS NOTES
[facility]  Infectious Disease        Patient ID: Ismael Gibson Jr. is a 57 y.o. male.    Chief Complaint: Follow-up and Infection      Interval HPI:    7/28/22: Initial consult. Tmax 100.5F this morning with rising leukocytosis 20k. Blood cx with GNR. Sputum cx pending. He is on precedex for agitation.  7/29/22: afebrile, leukocytosis a bit worse but patient feeling better. Pseudomonas on blood cx. Tunneled HD cath removed.   8/1/22 : remains afebrile. Noted to have a little confusion, wife states she thinks this is from pain meds he took this morning for back pain (chronic). SHA unable to be performed at this time due to COVID isolation status. Getting Tunneled cath today for continued HD needs. Repeat bcx Neg.  9/8/22: ID CLINIC visit:  Patient is here for hospital follow-up.  Patient tells me that he is feeling well.  Denies fever, chills, night sweats.  He tells me that he is receiving his antibiotics in the hemodialysis center    Assessment and Plan:     # Pseudomonas bacteremia  - 7/27 Bcx: Pseudomonas (panS) in 1/4 bottles  - 7/29 Bcx: NGTD  - ICD carrier  - TTE: EF 30%, enlarged LV, no mention of vegetation. Technically difficult study  - tunneled HD cath placed 7/25, removed 7/29  - No initial blood cultures to know if he was bacteremic on arrival, or if this is a new infection since hospitalization. His leukocytosis and fever are new so may be hospital-acquired  - DDx: CLABSI vs PNA (community or aspiration) vs sinusitis vs GI vs other    # Pneumonia due to COVID-19 and Pseudomonas  - 7/25 COVID-19 positive  - 7/25 CXR no acute findings  - 7/26 seizure episode  - 7/27 CXR: new left upper lobe opacity favoring pneumonia  - 7/29 sputum cx: Pseudomonas aeruginosa (panS)  - MRSA screen: MRSA  - procal 16.8 - 31.2    # COVID-19  - per Pulm  - Not currently on steroids or RDV    # Abnormal CT findings  - 7/26/22 CT Head: fluid and mucoperiosteal thickening paranasal sinuses. Correlate for  sinusitis    # Acute renal failure  - present on admission  - 7/26 UA not consistent with infection  - on HD since 7/25    Recommendations:  - Pseudomonas on blood and sputum cultures. Appears to have cleared from blood while in the hospital and patient received 6 weeks of ceftazidime post HD on dialysis days.  - Will request repeat blood cultures next week considering high-risk bacteremia  - Will follow with Infectious diseases as needed.    - All questions answered    20 min    HPI:    57yo male with a PMH of CAD, T2DM, HTN, seizure disorder on Keppra, h/o CVA (2018), and CHF with ICD implant who was admitted to [facility] on 7/25/22 for cough, diarrhea, and myalgias.    - 2 days prior to presentation, had diarrhea which improved with imodium. 24 hours prior to presentation he developed myalgias, fatigue, and a dry cough with poor oral intake  - 7/25/22 presented to [facility] ED. Found to be COVID-19+ in acute renal failure with Scr 12.9 requiring emergent placement of tunneled right IJ dialysis catheter and initiation of HD.   - early morning 7/26/22 he was noted to have seizure activity that lasted 3 mins and improved with Versed. Apparently had missed several home Keppra doses prior to admissions  - CT Head nothing acute, but fluid and mucoperiosteal thickening paranasal sinuses  - requiring intermittent sedation for restlessness/agitation  - 7/27/22 he was noted to have fever and leukocytosis. CXR with new RANDA opacity. Blood cultures were obtained and he was started on IV Meropenem  - Blood cultures subsequently +GNR    Infectious Diseases has been consulted for evaluation and management.        Past Medical History:   Diagnosis Date    COPD (chronic obstructive pulmonary disease)     Coronary artery disease     CVA (cerebral vascular accident)     last in 11/2018    Diabetes mellitus, type 2     Hypertension     Seizure        Past Surgical History:   Procedure Laterality Date    CARDIAC DEFIBRILLATOR PLACEMENT Left  6/22/2020    Procedure: Insertion, ICD;  Surgeon: Robbie Marks MD;  Location: [facility] CATH;  Service: Cardiology;  Laterality: Left;    CARDIAC SURGERY      CORONARY ANGIOGRAPHY N/A 6/19/2020    Procedure: ANGIOGRAM, CORONARY ARTERY;  Surgeon: Robbie Marks MD;  Location: [facility] CATH;  Service: Cardiology;  Laterality: N/A;    CORONARY ARTERY BYPASS GRAFT  02/26/2018    CORONARY BYPASS GRAFT ANGIOGRAPHY  6/19/2020    Procedure: Bypass graft study;  Surgeon: Robbie Marks MD;  Location: [facility] CATH;  Service: Cardiology;;    CORONARY STENT PLACEMENT      x9    INSERTION OF IMPLANTABLE LOOP RECORDER Left 11/11/2018    Procedure: Insertion, Implantable Loop Recorder;  Surgeon: Clive Duffy MD;  Location: [facility] CATH;  Service: Cardiology;  Laterality: Left;    LEFT HEART CATHETERIZATION Right 3/16/2019    Procedure: Left heart cath;  Surgeon: Robbie Marks MD;  Location: [facility] CATH;  Service: Cardiology;  Laterality: Right;    LEFT HEART CATHETERIZATION Right 6/19/2020    Procedure: Left heart cath;  Surgeon: Robbie Marks MD;  Location: [facility] CATH;  Service: Cardiology;  Laterality: Right;    REMOVAL OF IMPLANTABLE LOOP RECORDER Left 6/22/2020    Procedure: REMOVAL, IMPLANTABLE LOOP RECORDER;  Surgeon: Robbie Marks MD;  Location: [facility] CATH;  Service: Cardiology;  Laterality: Left;       Family History   Problem Relation Age of Onset    Coronary artery disease Father        Social History     Socioeconomic History    Marital status:    Tobacco Use    Smoking status: Every Day     Packs/day: 0.50     Types: Cigarettes    Smokeless tobacco: Never   Substance and Sexual Activity    Alcohol use: No    Drug use: No       Review of patient's allergies indicates:   Allergen Reactions    Penicillins Anaphylaxis       Current Outpatient Medications   Medication Instructions    amiodarone (PACERONE) 200 MG Tab TAKE 1 TABLET BY MOUTH EVERY DAY    amLODIPine (NORVASC) 10 mg,  "Oral, Nightly    aspirin 325 mg, Oral, Every morning    atorvastatin (LIPITOR) 40 mg, Oral, Every morning    BD ULTRA-FINE OTF PEN NEEDLE 32 gauge x 5/32" Ndle 1 each, Subcutaneous, Nightly, To inject insulin    blood sugar diagnostic Strp 1 each, Misc.(Non-Drug; Combo Route), 2 times daily before meals, For True Metrix meter    carvediloL (COREG) 6.25 mg, Oral, 2 times daily    clopidogreL (PLAVIX) 75 mg, Oral, Daily    dextrose 5 % SolP 50 mL with ceftAZIDime 1 gram SolR 1 g IV after HD on Tuesday and Thursday, 2g IV after HD on Saturday    ferrous gluconate (FERGON) 324 mg, Oral, With breakfast    hydrALAZINE (APRESOLINE) 10 mg, Oral, Every 8 hours    insulin detemir U-100 (LEVEMIR FLEXTOUCH) 20 Units, Subcutaneous, Nightly    lancets Misc 1 each, Misc.(Non-Drug; Combo Route), 2 times daily before meals, For True Metrix meter    levETIRAcetam (KEPPRA) 750 mg, Oral, 2 times daily    sevelamer carbonate (RENVELA) 1,600 mg, Oral, 3 times daily with meals         Review of Systems   Constitutional:  Negative for activity change, chills, diaphoresis, fatigue, fever and unexpected weight change.   HENT:  Negative for sore throat.    Eyes:  Negative for photophobia and visual disturbance.   Respiratory:  Positive for cough and wheezing. Negative for shortness of breath.    Cardiovascular:  Negative for chest pain and leg swelling.   Gastrointestinal:  Negative for abdominal distention, abdominal pain, nausea and vomiting.   Genitourinary:  Negative for difficulty urinating, dysuria and flank pain.   Musculoskeletal:  Positive for back pain. Negative for arthralgias.   Skin:  Negative for color change and rash.   Allergic/Immunologic: Negative for immunocompromised state.   Neurological:  Negative for dizziness and headaches.   Psychiatric/Behavioral:  The patient is not nervous/anxious.          Objective:     Temp:  [97.4 °F (36.3 °C)-98.8 °F (37.1 °C)] 98.3 °F (36.8 °C)  Pulse:  [56-85] 71  Resp:  [10-24] 18  SpO2:  " [88 %-98 %] 98 %  BP: ()/(46-89) 181/82         Physical Exam  Vitals and nursing note reviewed.   Constitutional:       General: He is not in acute distress.     Appearance: He is well-developed and well-groomed. He is not diaphoretic.   HENT:      Head: Normocephalic and atraumatic.      Right Ear: External ear normal.      Left Ear: External ear normal.      Nose: Nose normal.   Eyes:      General: No scleral icterus.        Right eye: No discharge.         Left eye: No discharge.      Pupils: Pupils are equal, round, and reactive to light.   Cardiovascular:      Rate and Rhythm: Normal rate and regular rhythm.      Heart sounds: Normal heart sounds. No murmur heard.     Comments: Right chest with evidence of HD catheter  Pulmonary:      Effort: Pulmonary effort is normal. No accessory muscle usage or respiratory distress.      Breath sounds: No wheezing.   Abdominal:      General: Bowel sounds are normal. There is no distension.      Palpations: Abdomen is soft.      Tenderness: There is no abdominal tenderness. There is no guarding.   Musculoskeletal:      Cervical back: Normal range of motion and neck supple.   Skin:     General: Skin is warm and dry.   Neurological:      General: No focal deficit present.      Mental Status: He is oriented to person, place, and time and easily aroused.   Psychiatric:         Mood and Affect: Mood normal.         Behavior: Behavior normal.         CrCl cannot be calculated (Patient's most recent lab result is older than the maximum 7 days allowed.).    Recent Labs   Lab 07/31/22  0719 08/01/22  0940   WBC 9.55 13.93*    198       Microbiology Results (last 7 days)       Procedure Component Value Units Date/Time    Blood culture [[id number]]     Order Status: No result Specimen: Blood     Blood culture [[id number]]     Order Status: No result Specimen: Blood               Significant Labs: All pertinent labs within the past 24 hours have been reviewed.      Significant Imaging: I have reviewed all relevant and available imaging results/findings within the past 24 hours.      Plan -- see top of note

## 2022-09-12 ENCOUNTER — CLINICAL SUPPORT (OUTPATIENT)
Dept: CARDIOLOGY | Facility: HOSPITAL | Age: 57
End: 2022-09-12
Payer: MEDICARE

## 2022-09-12 DIAGNOSIS — Z95.810 PRESENCE OF AUTOMATIC (IMPLANTABLE) CARDIAC DEFIBRILLATOR: ICD-10-CM

## 2022-09-12 PROCEDURE — 93296 REM INTERROG EVL PM/IDS: CPT | Mod: PO | Performed by: INTERNAL MEDICINE

## 2022-09-15 ENCOUNTER — LAB VISIT (OUTPATIENT)
Dept: LAB | Facility: HOSPITAL | Age: 57
End: 2022-09-15
Attending: INTERNAL MEDICINE
Payer: MEDICARE

## 2022-09-15 DIAGNOSIS — R78.81 BACTEREMIA DUE TO PSEUDOMONAS: ICD-10-CM

## 2022-09-15 DIAGNOSIS — B96.5 BACTEREMIA DUE TO PSEUDOMONAS: ICD-10-CM

## 2022-09-15 PROCEDURE — 87040 BLOOD CULTURE FOR BACTERIA: CPT | Mod: 59 | Performed by: INTERNAL MEDICINE

## 2022-09-20 LAB
BACTERIA BLD CULT: NORMAL
BACTERIA BLD CULT: NORMAL

## 2022-09-23 ENCOUNTER — TELEPHONE (OUTPATIENT)
Dept: INFECTIOUS DISEASES | Facility: CLINIC | Age: 57
End: 2022-09-23
Payer: MEDICARE

## 2022-09-23 NOTE — TELEPHONE ENCOUNTER
Spoke with Benita who stated [facility] called her house last night wanting to deliver IV abx, as she thought the abx were d/c'd at [facility]'s appt with Dr King on 9/8. I spoke with Kristen at [facility] who stated they had an EOC of 9/27 and patient was sent out Cefepime, as this is what they had on d/c orders. I forwarded the message to Dr King who ordered to have the Dialysis Center d/c the abx and not to give any more. He also wants to keep a close eye on Mr Gibson, as he stated He was undertreated with the wrong abx and wrong dosage, and wants weekly blood cultures and will f/u with Mr Gibson in the clinic in 3-4 weeks. Advised Mrs Gibson that I will get all of this set up and call her on Monday, as she was currently at the dialysis clinic.

## 2022-09-27 DIAGNOSIS — R78.81 BACTEREMIA DUE TO PSEUDOMONAS: Primary | ICD-10-CM

## 2022-09-27 DIAGNOSIS — B96.5 BACTEREMIA DUE TO PSEUDOMONAS: Primary | ICD-10-CM

## 2022-09-27 NOTE — TELEPHONE ENCOUNTER
Spoke with Mrs Gibson and advised that I've ordered the blood cultures weekly x 3 weeks. She stated they will come to the clinic here in [place] tomorrow after dialysis, and will get the other sets on consecutive Wednesdays.

## 2022-10-07 ENCOUNTER — TELEPHONE (OUTPATIENT)
Dept: INFECTIOUS DISEASES | Facility: CLINIC | Age: 57
End: 2022-10-07
Payer: MEDICARE

## 2022-10-07 NOTE — TELEPHONE ENCOUNTER
Spoke with Mrs Jesusler who wanted to let us know that Ismael has HH with [facility] who comes to their house weekly and that they can draw the weekly blood cultures that Dr King requested. Orders faxed to [facility] in [place] for one set of blood cultures weekly for 3 weeks.

## 2022-10-07 NOTE — TELEPHONE ENCOUNTER
----- Message from Cayla Reich sent at 10/7/2022  8:04 AM CDT -----  .Type:  Patient Call Back    Who Called: pt wife       Does the patient know what this is regarding?: she needs to speak with the office     Would the patient rather a call back yes     Best Call Back Number: [phone]    Additional Information: Thank You

## 2022-11-09 ENCOUNTER — OFFICE VISIT (OUTPATIENT)
Dept: CARDIOLOGY | Facility: CLINIC | Age: 57
End: 2022-11-09
Payer: MEDICARE

## 2022-11-09 VITALS
WEIGHT: 215.38 LBS | BODY MASS INDEX: 32.64 KG/M2 | DIASTOLIC BLOOD PRESSURE: 72 MMHG | HEART RATE: 58 BPM | HEIGHT: 68 IN | SYSTOLIC BLOOD PRESSURE: 146 MMHG

## 2022-11-09 DIAGNOSIS — I25.708 CORONARY ARTERY DISEASE OF BYPASS GRAFT OF NATIVE HEART WITH STABLE ANGINA PECTORIS: Primary | ICD-10-CM

## 2022-11-09 DIAGNOSIS — N18.6 ESRD (END STAGE RENAL DISEASE): ICD-10-CM

## 2022-11-09 DIAGNOSIS — I50.22 CHRONIC SYSTOLIC HEART FAILURE: ICD-10-CM

## 2022-11-09 DIAGNOSIS — I63.9 ISCHEMIC STROKE: ICD-10-CM

## 2022-11-09 DIAGNOSIS — Z95.810 S/P ICD (INTERNAL CARDIAC DEFIBRILLATOR) PROCEDURE: ICD-10-CM

## 2022-11-09 DIAGNOSIS — E78.00 PURE HYPERCHOLESTEROLEMIA: ICD-10-CM

## 2022-11-09 DIAGNOSIS — N17.9 AKI (ACUTE KIDNEY INJURY): ICD-10-CM

## 2022-11-09 PROCEDURE — 3077F SYST BP >= 140 MM HG: CPT | Mod: CPTII,S$GLB,, | Performed by: INTERNAL MEDICINE

## 2022-11-09 PROCEDURE — 99999 PR PBB SHADOW E&M-EST. PATIENT-LVL III: CPT | Mod: PBBFAC,,, | Performed by: INTERNAL MEDICINE

## 2022-11-09 PROCEDURE — 4010F ACE/ARB THERAPY RXD/TAKEN: CPT | Mod: CPTII,S$GLB,, | Performed by: INTERNAL MEDICINE

## 2022-11-09 PROCEDURE — 3061F PR NEG MICROALBUMINURIA RESULT DOCUMENTED/REVIEW: ICD-10-PCS | Mod: CPTII,S$GLB,, | Performed by: INTERNAL MEDICINE

## 2022-11-09 PROCEDURE — 3008F PR BODY MASS INDEX (BMI) DOCUMENTED: ICD-10-PCS | Mod: CPTII,S$GLB,, | Performed by: INTERNAL MEDICINE

## 2022-11-09 PROCEDURE — 99214 OFFICE O/P EST MOD 30 MIN: CPT | Mod: S$GLB,,, | Performed by: INTERNAL MEDICINE

## 2022-11-09 PROCEDURE — 3044F PR MOST RECENT HEMOGLOBIN A1C LEVEL <7.0%: ICD-10-PCS | Mod: CPTII,S$GLB,, | Performed by: INTERNAL MEDICINE

## 2022-11-09 PROCEDURE — 1159F PR MEDICATION LIST DOCUMENTED IN MEDICAL RECORD: ICD-10-PCS | Mod: CPTII,S$GLB,, | Performed by: INTERNAL MEDICINE

## 2022-11-09 PROCEDURE — 3008F BODY MASS INDEX DOCD: CPT | Mod: CPTII,S$GLB,, | Performed by: INTERNAL MEDICINE

## 2022-11-09 PROCEDURE — 3061F NEG MICROALBUMINURIA REV: CPT | Mod: CPTII,S$GLB,, | Performed by: INTERNAL MEDICINE

## 2022-11-09 PROCEDURE — 4010F PR ACE/ARB THEARPY RXD/TAKEN: ICD-10-PCS | Mod: CPTII,S$GLB,, | Performed by: INTERNAL MEDICINE

## 2022-11-09 PROCEDURE — 1159F MED LIST DOCD IN RCRD: CPT | Mod: CPTII,S$GLB,, | Performed by: INTERNAL MEDICINE

## 2022-11-09 PROCEDURE — 3078F DIAST BP <80 MM HG: CPT | Mod: CPTII,S$GLB,, | Performed by: INTERNAL MEDICINE

## 2022-11-09 PROCEDURE — 3066F NEPHROPATHY DOC TX: CPT | Mod: CPTII,S$GLB,, | Performed by: INTERNAL MEDICINE

## 2022-11-09 PROCEDURE — 3077F PR MOST RECENT SYSTOLIC BLOOD PRESSURE >= 140 MM HG: ICD-10-PCS | Mod: CPTII,S$GLB,, | Performed by: INTERNAL MEDICINE

## 2022-11-09 PROCEDURE — 3066F PR DOCUMENTATION OF TREATMENT FOR NEPHROPATHY: ICD-10-PCS | Mod: CPTII,S$GLB,, | Performed by: INTERNAL MEDICINE

## 2022-11-09 PROCEDURE — 99214 PR OFFICE/OUTPT VISIT, EST, LEVL IV, 30-39 MIN: ICD-10-PCS | Mod: S$GLB,,, | Performed by: INTERNAL MEDICINE

## 2022-11-09 PROCEDURE — 3044F HG A1C LEVEL LT 7.0%: CPT | Mod: CPTII,S$GLB,, | Performed by: INTERNAL MEDICINE

## 2022-11-09 PROCEDURE — 99999 PR PBB SHADOW E&M-EST. PATIENT-LVL III: ICD-10-PCS | Mod: PBBFAC,,, | Performed by: INTERNAL MEDICINE

## 2022-11-09 PROCEDURE — 3078F PR MOST RECENT DIASTOLIC BLOOD PRESSURE < 80 MM HG: ICD-10-PCS | Mod: CPTII,S$GLB,, | Performed by: INTERNAL MEDICINE

## 2022-11-09 RX ORDER — GABAPENTIN 100 MG/1
100 CAPSULE ORAL 2 TIMES DAILY
COMMUNITY
Start: 2022-10-18 | End: 2023-08-22

## 2022-11-09 RX ORDER — INSULIN ASPART 100 [IU]/ML
100 INJECTION, SOLUTION INTRAVENOUS; SUBCUTANEOUS
COMMUNITY
Start: 2022-08-29 | End: 2024-01-13 | Stop reason: CLARIF

## 2022-11-09 NOTE — PROGRESS NOTES
Subjective:    Patient ID:  Ismael Gibson Jr. is a 57 y.o. male who presents for follow-up of HF    HPI  He was admitted to [facility] this summer with COVID and acute kidney injury for which he had to be started on dialysis.  He had several complications including infection of the central line.  He finally was discharged home last month and he has been getting hemodialysis 3 times a week.  He comes for follow up with no major cardiac problems, no chest pain, no shortness of breath.  He is in the process of getting an AV fistula      Review of Systems   Constitutional: Negative for decreased appetite, malaise/fatigue, weight gain and weight loss.   Cardiovascular:  Negative for chest pain, dyspnea on exertion, leg swelling, palpitations and syncope.   Respiratory:  Negative for cough and shortness of breath.    Gastrointestinal: Negative.    Neurological:  Negative for weakness.   All other systems reviewed and are negative.     Objective:      Physical Exam  Vitals and nursing note reviewed.   Constitutional:       Appearance: Normal appearance. He is well-developed.   HENT:      Head: Normocephalic.   Eyes:      Pupils: Pupils are equal, round, and reactive to light.   Neck:      Thyroid: No thyromegaly.      Vascular: No carotid bruit or JVD.   Cardiovascular:      Rate and Rhythm: Normal rate and regular rhythm.      Chest Wall: PMI is not displaced.      Pulses: Normal pulses and intact distal pulses.      Heart sounds: Normal heart sounds. No murmur heard.    No gallop.   Pulmonary:      Effort: Pulmonary effort is normal.      Breath sounds: Normal breath sounds.   Abdominal:      Palpations: Abdomen is soft. There is no mass.      Tenderness: There is no abdominal tenderness.   Musculoskeletal:         General: Normal range of motion.      Cervical back: Normal range of motion and neck supple.   Skin:     General: Skin is warm.   Neurological:      Mental Status: He is alert and oriented to person,  place, and time.      Sensory: No sensory deficit.      Deep Tendon Reflexes: Reflexes are normal and symmetric.         Assessment:       1. Coronary artery disease of bypass graft of native heart with stable angina pectoris    2. Pure hypercholesterolemia    3. Ischemic stroke    4. Chronic systolic heart failure    5. S/P ICD (internal cardiac defibrillator) procedure    6. DINO (acute kidney injury)    7. ESRD (end stage renal disease)         Plan:     Continue all cardiac medications  Regular exercise program  Weight loss  Four-month follow-up with Joyce Jeter

## 2022-11-11 ENCOUNTER — TELEPHONE (OUTPATIENT)
Dept: CARDIOLOGY | Facility: CLINIC | Age: 57
End: 2022-11-11
Payer: MEDICARE

## 2022-11-11 NOTE — TELEPHONE ENCOUNTER
----- Message from Monika Pinto sent at 11/11/2022  2:51 PM CST -----  Type: Needs Medical Advice  Who Called:  pt wife, Benita  Symptoms (please be specific):  said he have a referral but the dr he referred him to no longer do the ports--said he need to get a referral to another dr--please call and advise  Best Call Back Number: [phone] (home)     Additional Information: thank you

## 2022-11-14 DIAGNOSIS — N18.6 ESRD (END STAGE RENAL DISEASE): Primary | ICD-10-CM

## 2022-11-14 PROBLEM — N17.9 ARF (ACUTE RENAL FAILURE): Status: RESOLVED | Noted: 2022-07-25 | Resolved: 2022-11-14

## 2022-11-14 PROBLEM — R29.898 BILATERAL LEG WEAKNESS: Status: ACTIVE | Noted: 2022-11-14

## 2022-11-14 PROBLEM — E87.5 HYPERKALEMIA: Status: ACTIVE | Noted: 2022-11-14

## 2022-12-11 ENCOUNTER — CLINICAL SUPPORT (OUTPATIENT)
Dept: CARDIOLOGY | Facility: HOSPITAL | Age: 57
End: 2022-12-11
Payer: MEDICARE

## 2022-12-11 DIAGNOSIS — Z95.810 PRESENCE OF AUTOMATIC (IMPLANTABLE) CARDIAC DEFIBRILLATOR: ICD-10-CM

## 2022-12-11 PROCEDURE — 93296 REM INTERROG EVL PM/IDS: CPT | Mod: PO | Performed by: INTERNAL MEDICINE

## 2022-12-11 PROCEDURE — 93295 DEV INTERROG REMOTE 1/2/MLT: CPT | Mod: ,,, | Performed by: INTERNAL MEDICINE

## 2022-12-11 PROCEDURE — 93295 CARDIAC DEVICE CHECK - REMOTE: ICD-10-PCS | Mod: ,,, | Performed by: INTERNAL MEDICINE

## 2022-12-16 ENCOUNTER — TELEPHONE (OUTPATIENT)
Dept: CARDIOLOGY | Facility: CLINIC | Age: 57
End: 2022-12-16
Payer: MEDICARE

## 2022-12-16 DIAGNOSIS — I50.22 CHRONIC SYSTOLIC HEART FAILURE: Primary | ICD-10-CM

## 2022-12-16 DIAGNOSIS — I95.9 HYPOTENSION, UNSPECIFIED HYPOTENSION TYPE: ICD-10-CM

## 2022-12-16 DIAGNOSIS — I10 PRIMARY HYPERTENSION: ICD-10-CM

## 2022-12-16 NOTE — TELEPHONE ENCOUNTER
----- Message from Vincent Abdi sent at 12/16/2022  2:36 PM CST -----  Contact: Serenity Lopez at  [phone]  Type: Needs Medical Advice  Who Called:  pt's  Serenity Lopez at   Best Call Back Number: [phone]  Additional Information: Serenity is calling the office to speak with the nurse to get some updated information on the pt. Please call back to advise.

## 2022-12-16 NOTE — TELEPHONE ENCOUNTER
Please advise: dialysis  called: pt will come to dialysis with -170 then after they start getting fluid off it plummets  to 80s-90 and they have to bolus him to keep him in the 90s. He is taking the hydralazine in the morning before dialysis (total three times daily), takes Norvasc at night.   They are asking how you feel about midodrine to get his pressure up during dialysis and if he needs an echo

## 2022-12-21 ENCOUNTER — TELEPHONE (OUTPATIENT)
Dept: CARDIOLOGY | Facility: CLINIC | Age: 57
End: 2022-12-21
Payer: MEDICARE

## 2022-12-21 NOTE — TELEPHONE ENCOUNTER
----- Message from Faustina Vides sent at 12/21/2022  7:41 AM CST -----  Regarding: Returning call  Contact: Isaak Jaeger with Nephrology  Type:  Patient Returning Call    Who Called:  Isaak Jaeger with Nephrology  Who Left Message for Patient:  Unknown  Does the patient know what this is regarding?:  Blood pressure  Best Call Back Number:  [phone]    Additional Information:  Isaak states she needs an updated cardiac status. Please call isaak to advise.Thanks!

## 2023-01-19 ENCOUNTER — TELEPHONE (OUTPATIENT)
Dept: TRANSPLANT | Facility: CLINIC | Age: 58
End: 2023-01-19
Payer: MEDICARE

## 2023-01-25 ENCOUNTER — DOCUMENTATION ONLY (OUTPATIENT)
Dept: TRANSPLANT | Facility: CLINIC | Age: 58
End: 2023-01-25
Payer: MEDICARE

## 2023-01-31 ENCOUNTER — TELEPHONE (OUTPATIENT)
Dept: CARDIOLOGY | Facility: HOSPITAL | Age: 58
End: 2023-01-31
Payer: MEDICARE

## 2023-02-01 ENCOUNTER — TELEPHONE (OUTPATIENT)
Dept: CARDIOLOGY | Facility: HOSPITAL | Age: 58
End: 2023-02-01
Payer: MEDICARE

## 2023-03-11 ENCOUNTER — CLINICAL SUPPORT (OUTPATIENT)
Dept: CARDIOLOGY | Facility: HOSPITAL | Age: 58
End: 2023-03-11
Payer: MEDICARE

## 2023-03-11 DIAGNOSIS — Z95.810 PRESENCE OF AUTOMATIC (IMPLANTABLE) CARDIAC DEFIBRILLATOR: ICD-10-CM

## 2023-03-11 PROCEDURE — 93296 REM INTERROG EVL PM/IDS: CPT | Mod: PO | Performed by: INTERNAL MEDICINE

## 2023-04-11 ENCOUNTER — TELEPHONE (OUTPATIENT)
Dept: CARDIOLOGY | Facility: HOSPITAL | Age: 58
End: 2023-04-11
Payer: MEDICARE

## 2023-05-03 ENCOUNTER — TELEPHONE (OUTPATIENT)
Dept: CARDIOLOGY | Facility: HOSPITAL | Age: 58
End: 2023-05-03
Payer: MEDICARE

## 2023-06-09 ENCOUNTER — CLINICAL SUPPORT (OUTPATIENT)
Dept: CARDIOLOGY | Facility: HOSPITAL | Age: 58
End: 2023-06-09
Payer: MEDICARE

## 2023-06-09 DIAGNOSIS — Z95.810 PRESENCE OF AUTOMATIC (IMPLANTABLE) CARDIAC DEFIBRILLATOR: ICD-10-CM

## 2023-06-09 PROCEDURE — 93296 REM INTERROG EVL PM/IDS: CPT | Mod: PO | Performed by: INTERNAL MEDICINE

## 2023-06-09 PROCEDURE — 93295 DEV INTERROG REMOTE 1/2/MLT: CPT | Mod: ,,, | Performed by: INTERNAL MEDICINE

## 2023-06-09 PROCEDURE — 93295 CARDIAC DEVICE CHECK - REMOTE: ICD-10-PCS | Mod: ,,, | Performed by: INTERNAL MEDICINE

## 2023-06-30 ENCOUNTER — TELEPHONE (OUTPATIENT)
Dept: CARDIOLOGY | Facility: HOSPITAL | Age: 58
End: 2023-06-30
Payer: MEDICARE

## 2023-07-11 NOTE — LETTER
January 19, 2023    Ludwig Rosales  [address] [address]  Phone: [phone]  Fax: [phone]    Dear Dr. Ludwig Rosales:    Patient: Ismael Gibson Jr.  MR Number [id number]  Date of Birth 1965    Thank you for your referral of Ismael Gibson Jr. to our transplant program.      Your patient's information will be screened by our Referral Nurse Coordinators to determine initial medical candidacy and if any further records are required. We will contact you if further information is needed to process the referral.     Once all needed information is received it will be forwarded to our Transplant Financial Coordinators who will obtain insurance authorization. Upon insurance approval, your patient will be contacted by our  to schedule their appointments with the transplant team. When appointments are made you will receive a copy of the appointment letter that your patient will receive.     This process may take two to six weeks to complete.     In the event your patient is not a candidate a copy of our transplant programs opinion including reasons will be returned to you to comply with your yearly review regulations. A copy of that letter will also be sent to the patient.     The following information is needed to process a referral:  Medicare form 2728 for all patients on dialysis.  Dental clearance is required if your patient has primary Medicaid.  Current immunization record.  This information can be faxed to [phone].  Current Dietician evaluation can be faxed to [phone].    Thank you again for your trust in our program and the care of your patient.  If there is anything we can do for you or your staff, please feel free to contact us at [phone].    Sincerely,   [facility] [place]  [facility]Pancreas Transplant Team  [facility] [place] [address] [phone]   Tazorac Counseling:  Patient advised that medication is irritating and drying.  Patient may need to apply sparingly and wash off after an hour before eventually leaving it on overnight.  The patient verbalized understanding of the proper use and possible adverse effects of tazorac.  All of the patient's questions and concerns were addressed.

## 2023-07-13 ENCOUNTER — TELEPHONE (OUTPATIENT)
Dept: CARDIOLOGY | Facility: HOSPITAL | Age: 58
End: 2023-07-13
Payer: MEDICARE

## 2023-07-13 DIAGNOSIS — Z95.810 PRESENCE OF AUTOMATIC (IMPLANTABLE) CARDIAC DEFIBRILLATOR: Primary | ICD-10-CM

## 2023-07-13 DIAGNOSIS — I50.22 CHRONIC SYSTOLIC HEART FAILURE: ICD-10-CM

## 2023-07-13 NOTE — TELEPHONE ENCOUNTER
Notification received from home monitor on billable:  RV Lead impedance gradually increasing on trending      [employer] ICD, implanted 6/22/2020        Discussed with Dr. Green, CXR ordered, notification sent to Dr. Marks

## 2023-07-14 ENCOUNTER — HOSPITAL ENCOUNTER (OUTPATIENT)
Dept: RADIOLOGY | Facility: HOSPITAL | Age: 58
Discharge: HOME | End: 2023-07-14
Attending: INTERNAL MEDICINE
Payer: MEDICARE

## 2023-07-14 ENCOUNTER — CLINICAL SUPPORT (OUTPATIENT)
Dept: CARDIOLOGY | Facility: HOSPITAL | Age: 58
End: 2023-07-14
Attending: INTERNAL MEDICINE
Payer: MEDICARE

## 2023-07-14 DIAGNOSIS — Z95.810 PRESENCE OF AUTOMATIC (IMPLANTABLE) CARDIAC DEFIBRILLATOR: ICD-10-CM

## 2023-07-14 DIAGNOSIS — I50.22 CHRONIC SYSTOLIC HEART FAILURE: ICD-10-CM

## 2023-07-14 PROCEDURE — 71046 X-RAY EXAM CHEST 2 VIEWS: CPT | Mod: 26,,, | Performed by: RADIOLOGY

## 2023-07-14 PROCEDURE — 71046 X-RAY EXAM CHEST 2 VIEWS: CPT | Mod: TC,FY,PO

## 2023-07-14 PROCEDURE — 71046 XR CHEST PA AND LATERAL: ICD-10-PCS | Mod: 26,,, | Performed by: RADIOLOGY

## 2023-07-14 NOTE — TELEPHONE ENCOUNTER
CXR completed 7/14/23    Device check completed per [employer] rep, Evan  RV impedence increase noted since 4/2023, RV pacing 4%  Shock impedence WNL  No noise detected at this time  Suggested per Errol at [facility] support, continue to monitor for any noise and continued increase in impedence

## 2023-08-22 ENCOUNTER — DOCUMENTATION ONLY (OUTPATIENT)
Dept: CARDIOLOGY | Facility: HOSPITAL | Age: 58
End: 2023-08-22
Payer: MEDICARE

## 2023-08-22 ENCOUNTER — OFFICE VISIT (OUTPATIENT)
Dept: CARDIOLOGY | Facility: CLINIC | Age: 58
End: 2023-08-22
Payer: MEDICARE

## 2023-08-22 VITALS
HEIGHT: 68 IN | BODY MASS INDEX: 35.31 KG/M2 | WEIGHT: 233 LBS | HEART RATE: 61 BPM | SYSTOLIC BLOOD PRESSURE: 128 MMHG | DIASTOLIC BLOOD PRESSURE: 70 MMHG

## 2023-08-22 DIAGNOSIS — I49.01 VENTRICULAR FIBRILLATION: Primary | ICD-10-CM

## 2023-08-22 DIAGNOSIS — Z95.810 S/P ICD (INTERNAL CARDIAC DEFIBRILLATOR) PROCEDURE: ICD-10-CM

## 2023-08-22 DIAGNOSIS — Z72.0 TOBACCO ABUSE: ICD-10-CM

## 2023-08-22 DIAGNOSIS — E78.00 PURE HYPERCHOLESTEROLEMIA: ICD-10-CM

## 2023-08-22 DIAGNOSIS — I10 PRIMARY HYPERTENSION: ICD-10-CM

## 2023-08-22 DIAGNOSIS — I25.708 CORONARY ARTERY DISEASE OF BYPASS GRAFT OF NATIVE HEART WITH STABLE ANGINA PECTORIS: ICD-10-CM

## 2023-08-22 PROCEDURE — 1160F PR REVIEW ALL MEDS BY PRESCRIBER/CLIN PHARMACIST DOCUMENTED: ICD-10-PCS | Mod: CPTII,S$GLB,, | Performed by: INTERNAL MEDICINE

## 2023-08-22 PROCEDURE — 99214 OFFICE O/P EST MOD 30 MIN: CPT | Mod: S$GLB,,, | Performed by: INTERNAL MEDICINE

## 2023-08-22 PROCEDURE — 3078F PR MOST RECENT DIASTOLIC BLOOD PRESSURE < 80 MM HG: ICD-10-PCS | Mod: CPTII,S$GLB,, | Performed by: INTERNAL MEDICINE

## 2023-08-22 PROCEDURE — 3078F DIAST BP <80 MM HG: CPT | Mod: CPTII,S$GLB,, | Performed by: INTERNAL MEDICINE

## 2023-08-22 PROCEDURE — 1159F MED LIST DOCD IN RCRD: CPT | Mod: CPTII,S$GLB,, | Performed by: INTERNAL MEDICINE

## 2023-08-22 PROCEDURE — 3074F PR MOST RECENT SYSTOLIC BLOOD PRESSURE < 130 MM HG: ICD-10-PCS | Mod: CPTII,S$GLB,, | Performed by: INTERNAL MEDICINE

## 2023-08-22 PROCEDURE — 3008F PR BODY MASS INDEX (BMI) DOCUMENTED: ICD-10-PCS | Mod: CPTII,S$GLB,, | Performed by: INTERNAL MEDICINE

## 2023-08-22 PROCEDURE — 99214 PR OFFICE/OUTPT VISIT, EST, LEVL IV, 30-39 MIN: ICD-10-PCS | Mod: S$GLB,,, | Performed by: INTERNAL MEDICINE

## 2023-08-22 PROCEDURE — 3008F BODY MASS INDEX DOCD: CPT | Mod: CPTII,S$GLB,, | Performed by: INTERNAL MEDICINE

## 2023-08-22 PROCEDURE — 99999 PR PBB SHADOW E&M-EST. PATIENT-LVL III: ICD-10-PCS | Mod: PBBFAC,,, | Performed by: INTERNAL MEDICINE

## 2023-08-22 PROCEDURE — 1160F RVW MEDS BY RX/DR IN RCRD: CPT | Mod: CPTII,S$GLB,, | Performed by: INTERNAL MEDICINE

## 2023-08-22 PROCEDURE — 3074F SYST BP LT 130 MM HG: CPT | Mod: CPTII,S$GLB,, | Performed by: INTERNAL MEDICINE

## 2023-08-22 PROCEDURE — 1159F PR MEDICATION LIST DOCUMENTED IN MEDICAL RECORD: ICD-10-PCS | Mod: CPTII,S$GLB,, | Performed by: INTERNAL MEDICINE

## 2023-08-22 PROCEDURE — 99999 PR PBB SHADOW E&M-EST. PATIENT-LVL III: CPT | Mod: PBBFAC,,, | Performed by: INTERNAL MEDICINE

## 2023-08-22 NOTE — PROGRESS NOTES
Patient seen in clinic for appt with Dr. Marks, questions regarding lead impedence  Spoke with tech support, Kristy  RV lead impedence today 2140, still within range with steady incline, nothing acute, >3000 out of range  Shock impedence good  No noise detected   Nothing requiring immediate attention  Continue to monitor for any acute changes or rise and monitor for Short RV interval count

## 2023-08-22 NOTE — PROGRESS NOTES
Subjective:    Patient ID:  Ismael Gibson Jr. is a 57 y.o. male who presents for follow-up of ischemic cardiomyopathy    HPI  He comes for follow up with no major problems, no chest pain, no shortness of breath.  Unfortunately, he is back to smoking.    Blood pressure normal at home.    Getting hemodialysis 2 days week    Review of Systems   Constitutional: Negative for decreased appetite, malaise/fatigue, weight gain and weight loss.   Cardiovascular:  Negative for chest pain, dyspnea on exertion, leg swelling, palpitations and syncope.   Respiratory:  Negative for cough and shortness of breath.    Gastrointestinal: Negative.    Neurological:  Negative for weakness.   All other systems reviewed and are negative.       Objective:      Physical Exam  Vitals and nursing note reviewed.   Constitutional:       Appearance: Normal appearance. He is well-developed.   HENT:      Head: Normocephalic.   Eyes:      Pupils: Pupils are equal, round, and reactive to light.   Neck:      Thyroid: No thyromegaly.      Vascular: No carotid bruit or JVD.   Cardiovascular:      Rate and Rhythm: Normal rate and regular rhythm.      Chest Wall: PMI is not displaced.      Pulses: Normal pulses and intact distal pulses.      Heart sounds: Normal heart sounds. No murmur heard.     No gallop.   Pulmonary:      Effort: Pulmonary effort is normal.      Breath sounds: Normal breath sounds.   Abdominal:      Palpations: Abdomen is soft. There is no mass.      Tenderness: There is no abdominal tenderness.   Musculoskeletal:         General: Normal range of motion.      Cervical back: Normal range of motion and neck supple.   Skin:     General: Skin is warm.   Neurological:      Mental Status: He is alert and oriented to person, place, and time.      Sensory: No sensory deficit.      Deep Tendon Reflexes: Reflexes are normal and symmetric.             Most Recent EKG Results  Results for orders placed or performed during the hospital encounter  of 11/14/22   EKG 12-lead    Collection Time: 11/14/22  3:44 PM    Narrative    Test Reason : R53.1,    Vent. Rate : 148 BPM     Atrial Rate : 060 BPM     P-R Int : 000 ms          QRS Dur : 132 ms      QT Int : 126 ms       P-R-T Axes : -01 -31 000 degrees     QTc Int : 197 ms    Ventricular-paced rhythm  Abnormal ECG  When compared with ECG of 11-AUG-2022 11:07,  Current undetermined rhythm precludes rhythm comparison, needs review  Left bundle branch block is now Present  Borderline criteria for Anterior infarct are no longer Present  Criteria for Inferior infarct are no longer Present  Confirmed by Harlan MAN, Antoine PEREZ ([id number]) on 11/14/2022 4:14:54 PM    Referred By: [email or web address]   SELF           Confirmed By:Antoine Claros MD       Most Recent Echocardiogram Results  Results for orders placed during the hospital encounter of 07/25/22    Echo    Interpretation Summary  · The estimated ejection fraction is 30%.  · The left ventricle is severely enlarged with mild concentric hypertrophy and severely decreased systolic function.  · Grade II left ventricular diastolic dysfunction.  · Normal right ventricular size with normal right ventricular systolic function.  · Mild left atrial enlargement.      Most Recent Nuclear Stress Test Results  Results for orders placed during the hospital encounter of 03/14/19    Stress test with myocardial perfusion (Cupid Only)    Interpretation Summary  · There is a large area of moderate to severe, mostly reversible, perfusion deficit located in the inferior wall consistent with ischemia with a small area of underlying infarct.  · There is a small area of moderate to severe, fixed, perfusion deficit located in the anteroseptal wall consistent with infarct.  · The EKG portion of this study is negative for myocardial ischemia.  · An ejection fraction of 24 % at rest  · There is severe global hypokinesis.  · There is no prior study available for comparison.      Most Recent Cardiac PET  Stress Test Results  No results found for this or any previous visit.      Most Recent Cardiovascular Angiogram results  Results for orders placed during the hospital encounter of 06/19/20    Cardiac catheterization    Conclusion  · Three vessel coronary artery disease with patent grafts to the MILLER, diagonals and OM.  · Coronary angiogram unchanged compared to the one he had back in 2019    I certify that I was present for catheter insertion, catheter manipulation, angiography, and angiographic interpretation of this patient.    Procedure Log documented by Documenter: Shakila Sosa RN and verified by Robbie Marks MD.    Date: 6/19/2020  Time: 10:25 AM      Other Most Recent Cardiology Results  Results for orders placed in visit on 09/07/23    Cardiac device check - Remote    Narrative  Battery and Leads (BL)  Auto-threshold measurement unavailable or programmed off on lead(s)  Normal battery parameters  Lead impedance gradually increasing on trending    Presenting Rhythm (MT)  Presenting rhythm not included in transmission    Arrhythmic events (AE)  No new arrhythmic events in monitoring period    Cardiovascular Physiologic Parameters (CPP)  No abnormalities in physiologic parameters identified    Transmission Information (TI)  Device Summary Report  Clinical Alert Report    Follow Up (FU)  Continue remote monitoring with quarterly reporting  Practitioner contacted with findings based on Escalation Criteria    Additional Comments  ICD Report  Monitoring period: 4/16/23-7/12/23    Battery/Lead Status: Ok/RV Lead impedance gradually increasing on trending    : 5%    Meets escalation criteria due to gradual increase in RV impedance. Sent to follow-up tab on 7/13/23 @ 6:07AM [facility] with preliminary interrogation findings.    *CXR and device check completed 7/14/23      Labs reviewed    Assessment:       1. Ventricular fibrillation    2. S/P ICD (internal cardiac defibrillator) procedure    3. Primary  hypertension    4. Pure hypercholesterolemia    5. Coronary artery disease of bypass graft of native heart with stable angina pectoris    6. Tobacco abuse         Plan:     Continue:  Antiplatelet, ASA, Beta blocker, Calcium blocker, and Statin, amiodarone  Regular exercise program  Weight loss  Low cholesterol diet  ICD lead to be replaced when indicated by [employer] technical support  Six-month follow-up

## 2023-09-07 ENCOUNTER — CLINICAL SUPPORT (OUTPATIENT)
Dept: CARDIOLOGY | Facility: HOSPITAL | Age: 58
End: 2023-09-07
Payer: MEDICARE

## 2023-09-07 DIAGNOSIS — Z95.810 PRESENCE OF AUTOMATIC (IMPLANTABLE) CARDIAC DEFIBRILLATOR: ICD-10-CM

## 2023-09-07 PROCEDURE — 93296 REM INTERROG EVL PM/IDS: CPT | Mod: PO | Performed by: INTERNAL MEDICINE

## 2023-12-06 ENCOUNTER — CLINICAL SUPPORT (OUTPATIENT)
Dept: CARDIOLOGY | Facility: HOSPITAL | Age: 58
End: 2023-12-06
Payer: MEDICARE

## 2023-12-06 DIAGNOSIS — Z95.810 PRESENCE OF AUTOMATIC (IMPLANTABLE) CARDIAC DEFIBRILLATOR: ICD-10-CM

## 2023-12-06 PROCEDURE — 93296 REM INTERROG EVL PM/IDS: CPT | Mod: PO | Performed by: INTERNAL MEDICINE

## 2023-12-07 ENCOUNTER — CLINICAL SUPPORT (OUTPATIENT)
Dept: CARDIOLOGY | Facility: HOSPITAL | Age: 58
End: 2023-12-07
Attending: INTERNAL MEDICINE
Payer: MEDICARE

## 2023-12-07 PROCEDURE — 93295 DEV INTERROG REMOTE 1/2/MLT: CPT | Mod: ,,, | Performed by: INTERNAL MEDICINE

## 2023-12-07 PROCEDURE — 93295 CARDIAC DEVICE CHECK CHECK - REMOTE ALERT: ICD-10-PCS | Mod: ,,, | Performed by: INTERNAL MEDICINE

## 2023-12-12 ENCOUNTER — TELEPHONE (OUTPATIENT)
Dept: CARDIOLOGY | Facility: HOSPITAL | Age: 58
End: 2023-12-12
Payer: MEDICARE

## 2023-12-12 NOTE — TELEPHONE ENCOUNTER
Discussion of increase impedence     RV lead impedence out of range, >3000ohm        Shock impedence WNL  Short RV interval count 0

## 2023-12-13 ENCOUNTER — TELEPHONE (OUTPATIENT)
Dept: CARDIOLOGY | Facility: HOSPITAL | Age: 58
End: 2023-12-13
Payer: MEDICARE

## 2023-12-13 DIAGNOSIS — T82.190A: ICD-10-CM

## 2023-12-13 DIAGNOSIS — Z95.810 S/P ICD (INTERNAL CARDIAC DEFIBRILLATOR) PROCEDURE: Primary | ICD-10-CM

## 2023-12-13 NOTE — TELEPHONE ENCOUNTER
Left message requesting a return call to discuss plan of care.(Consult [facility] Dr. Santillan for extraction per Dr. Marks)

## 2023-12-15 LAB
OHS CV AF BURDEN PERCENT: < 1
OHS CV DC REMOTE DEVICE TYPE: NORMAL
OHS CV ICD SHOCK: NO
OHS CV RV PACING PERCENT: 2 %

## 2024-01-01 ENCOUNTER — APPOINTMENT (OUTPATIENT)
Dept: CARDIOLOGY | Facility: HOSPITAL | Age: 59
End: 2024-01-01
Attending: INTERNAL MEDICINE
Payer: MEDICARE

## 2024-01-03 ENCOUNTER — TELEPHONE (OUTPATIENT)
Dept: ELECTROPHYSIOLOGY | Facility: CLINIC | Age: 59
End: 2024-01-03
Payer: MEDICARE

## 2024-01-13 PROBLEM — E87.70 VOLUME OVERLOAD: Status: ACTIVE | Noted: 2024-01-13

## 2024-01-13 PROBLEM — Z91.158 NONCOMPLIANCE WITH RENAL DIALYSIS: Status: ACTIVE | Noted: 2024-01-13

## 2024-01-13 PROBLEM — S39.94XA PENILE TRAUMA: Status: ACTIVE | Noted: 2024-01-13

## 2024-01-13 PROBLEM — R93.89 ABNORMAL CXR: Status: ACTIVE | Noted: 2024-01-13

## 2024-01-13 PROBLEM — I5A MYOCARDIAL INJURY: Status: ACTIVE | Noted: 2024-01-13

## 2024-01-13 PROBLEM — I50.42 CHRONIC COMBINED SYSTOLIC AND DIASTOLIC HEART FAILURE: Status: ACTIVE | Noted: 2018-11-11

## 2024-01-13 PROBLEM — Z71.89 ADVANCE CARE PLANNING: Status: ACTIVE | Noted: 2024-01-13

## 2024-01-15 PROBLEM — I50.42 CHRONIC COMBINED SYSTOLIC (CONGESTIVE) AND DIASTOLIC (CONGESTIVE) HEART FAILURE: Chronic | Status: ACTIVE | Noted: 2018-11-11

## 2024-01-15 PROBLEM — R18.8 ASCITES: Status: ACTIVE | Noted: 2024-01-15

## 2024-01-30 ENCOUNTER — TELEPHONE (OUTPATIENT)
Dept: CARDIOLOGY | Facility: HOSPITAL | Age: 59
End: 2024-01-30
Payer: MEDICARE

## 2024-02-07 ENCOUNTER — OFFICE VISIT (OUTPATIENT)
Dept: CARDIOLOGY | Facility: CLINIC | Age: 59
End: 2024-02-07
Payer: MEDICARE

## 2024-02-07 VITALS
HEIGHT: 69 IN | SYSTOLIC BLOOD PRESSURE: 114 MMHG | HEART RATE: 58 BPM | WEIGHT: 227 LBS | BODY MASS INDEX: 33.62 KG/M2 | DIASTOLIC BLOOD PRESSURE: 63 MMHG

## 2024-02-07 DIAGNOSIS — N18.6 ESRD (END STAGE RENAL DISEASE): ICD-10-CM

## 2024-02-07 DIAGNOSIS — I50.42 CHRONIC COMBINED SYSTOLIC (CONGESTIVE) AND DIASTOLIC (CONGESTIVE) HEART FAILURE: Primary | Chronic | ICD-10-CM

## 2024-02-07 DIAGNOSIS — I10 PRIMARY HYPERTENSION: ICD-10-CM

## 2024-02-07 DIAGNOSIS — Z95.810 S/P ICD (INTERNAL CARDIAC DEFIBRILLATOR) PROCEDURE: ICD-10-CM

## 2024-02-07 DIAGNOSIS — I25.708 CORONARY ARTERY DISEASE OF BYPASS GRAFT OF NATIVE HEART WITH STABLE ANGINA PECTORIS: ICD-10-CM

## 2024-02-07 PROCEDURE — 3066F NEPHROPATHY DOC TX: CPT | Mod: CPTII,S$GLB,, | Performed by: INTERNAL MEDICINE

## 2024-02-07 PROCEDURE — 1111F DSCHRG MED/CURRENT MED MERGE: CPT | Mod: CPTII,S$GLB,, | Performed by: INTERNAL MEDICINE

## 2024-02-07 PROCEDURE — 99214 OFFICE O/P EST MOD 30 MIN: CPT | Mod: S$GLB,,, | Performed by: INTERNAL MEDICINE

## 2024-02-07 PROCEDURE — 3044F HG A1C LEVEL LT 7.0%: CPT | Mod: CPTII,S$GLB,, | Performed by: INTERNAL MEDICINE

## 2024-02-07 PROCEDURE — 3074F SYST BP LT 130 MM HG: CPT | Mod: CPTII,S$GLB,, | Performed by: INTERNAL MEDICINE

## 2024-02-07 PROCEDURE — 3078F DIAST BP <80 MM HG: CPT | Mod: CPTII,S$GLB,, | Performed by: INTERNAL MEDICINE

## 2024-02-07 PROCEDURE — 99999 PR PBB SHADOW E&M-EST. PATIENT-LVL IV: CPT | Mod: PBBFAC,,, | Performed by: INTERNAL MEDICINE

## 2024-02-07 PROCEDURE — 1160F RVW MEDS BY RX/DR IN RCRD: CPT | Mod: CPTII,S$GLB,, | Performed by: INTERNAL MEDICINE

## 2024-02-07 PROCEDURE — 3008F BODY MASS INDEX DOCD: CPT | Mod: CPTII,S$GLB,, | Performed by: INTERNAL MEDICINE

## 2024-02-07 PROCEDURE — 1159F MED LIST DOCD IN RCRD: CPT | Mod: CPTII,S$GLB,, | Performed by: INTERNAL MEDICINE

## 2024-02-07 RX ORDER — ALLOPURINOL 100 MG/1
1 TABLET ORAL EVERY MORNING
COMMUNITY
End: 2024-04-10 | Stop reason: CLARIF

## 2024-02-07 NOTE — PROGRESS NOTES
Subjective:    Patient ID:  Ismael Gibson Jr. is a 58 y.o. male who presents for follow-up of ischemic cardiomyopathy and malfunctioning ICD lead    HPI  He comes for follow up with no major problems, no chest pain, no shortness of breath.  His ICD lead is not functioning properly and he is scheduled to see Dr. Santillan in the main campus for possible lead retrieval and placement of a new ICD lead      Review of Systems   Constitutional: Negative for decreased appetite, malaise/fatigue, weight gain and weight loss.   Cardiovascular:  Negative for chest pain, dyspnea on exertion, leg swelling, palpitations and syncope.   Respiratory:  Negative for cough and shortness of breath.    Gastrointestinal: Negative.    Neurological:  Negative for weakness.   All other systems reviewed and are negative.     Objective:      Physical Exam  Vitals and nursing note reviewed.   Constitutional:       Appearance: Normal appearance. He is well-developed.   HENT:      Head: Normocephalic.   Eyes:      Pupils: Pupils are equal, round, and reactive to light.   Neck:      Thyroid: No thyromegaly.      Vascular: No carotid bruit or JVD.   Cardiovascular:      Rate and Rhythm: Normal rate and regular rhythm.      Chest Wall: PMI is not displaced.      Pulses: Normal pulses and intact distal pulses.      Heart sounds: Normal heart sounds. No murmur heard.     No gallop.   Pulmonary:      Effort: Pulmonary effort is normal.      Breath sounds: Normal breath sounds.   Abdominal:      Palpations: Abdomen is soft. There is no mass.      Tenderness: There is no abdominal tenderness.   Musculoskeletal:         General: Normal range of motion.      Cervical back: Normal range of motion and neck supple.   Skin:     General: Skin is warm.   Neurological:      Mental Status: He is alert and oriented to person, place, and time.      Sensory: No sensory deficit.      Deep Tendon Reflexes: Reflexes are normal and symmetric.         Most Recent EKG  Results  Results for orders placed or performed during the hospital encounter of 01/13/24   EKG 12-lead    Collection Time: 01/13/24 10:26 AM    Narrative    Test Reason : R53.1,    Vent. Rate : 061 BPM     Atrial Rate : 061 BPM     P-R Int : 180 ms          QRS Dur : 124 ms      QT Int : 446 ms       P-R-T Axes : 055 033 205 degrees     QTc Int : 448 ms    Normal sinus rhythm  Anteroseptal infarct ,age undetermined  Abnormal ECG  When compared with ECG of 14-NOV-2022 15:44,  Sinus rhythm has replaced Electronic ventricular pacemaker  Vent. rate has decreased BY  87 BPM  Confirmed by Guanaco DEGROOT MD, Paul F. ([id number]) on 1/18/2024 5:56:51 AM    Referred By:             Confirmed By:Errol Blanc III, MD       Most Recent Echocardiogram Results  Results for orders placed during the hospital encounter of 01/13/24    Echo    Interpretation Summary    Left Ventricle: Severe global hypokinesis and regional wall motion abnormalities present.  The mid and apical portions of the left ventricle relatively akinetic.  Best wall motion at the cardiac base. There is severely reduced systolic function with a visually estimated ejection fraction of 25 - 30%.    Right Ventricle: Normal right ventricular cavity size. Wall thickness is normal. Right ventricle wall motion  is normal. Systolic function is normal.    Limited cardiac echo study.  Color exam, Doppler exam was not performed.      Most Recent Nuclear Stress Test Results  Results for orders placed during the hospital encounter of 03/14/19    Stress test with myocardial perfusion (Cupid Only)    Interpretation Summary  · There is a large area of moderate to severe, mostly reversible, perfusion deficit located in the inferior wall consistent with ischemia with a small area of underlying infarct.  · There is a small area of moderate to severe, fixed, perfusion deficit located in the anteroseptal wall consistent with infarct.  · The EKG portion of this study is negative for  myocardial ischemia.  · An ejection fraction of 24 % at rest  · There is severe global hypokinesis.  · There is no prior study available for comparison.      Most Recent Cardiac PET Stress Test Results  No results found for this or any previous visit.      Most Recent Cardiovascular Angiogram results  Results for orders placed during the hospital encounter of 06/19/20    Cardiac catheterization    Conclusion  · Three vessel coronary artery disease with patent grafts to the MILLER, diagonals and OM.  · Coronary angiogram unchanged compared to the one he had back in 2019    I certify that I was present for catheter insertion, catheter manipulation, angiography, and angiographic interpretation of this patient.    Procedure Log documented by Documenter: Shakila Sosa RN and verified by Robbie Marks MD.    Date: 6/19/2020  Time: 10:25 AM      Other Most Recent Cardiology Results  Results for orders placed during the hospital encounter of 01/13/24    CARDIAC MONITORING STRIPS      Labs reviewed    Assessment:       1. Chronic combined systolic (congestive) and diastolic (congestive) heart failure    2. S/P ICD (internal cardiac defibrillator) procedure    3. Coronary artery disease of bypass graft of native heart with stable angina pectoris    4. Primary hypertension    5. ESRD (end stage renal disease)         Plan:     Continue:  Antiarrhythmic, Antiplatelet, ASA, Beta blocker, Calcium blocker, Diuretic, MRA, and Statin  Regular exercise program  Weight loss  Low cholesterol diet    Follow-up in 4-6 months after AICD lead revision/replacement

## 2024-02-09 DIAGNOSIS — I63.9 ISCHEMIC STROKE: Primary | ICD-10-CM

## 2024-02-23 DIAGNOSIS — I49.9 CARDIAC ARRHYTHMIA, UNSPECIFIED CARDIAC ARRHYTHMIA TYPE: Primary | ICD-10-CM

## 2024-02-28 ENCOUNTER — TELEPHONE (OUTPATIENT)
Dept: ELECTROPHYSIOLOGY | Facility: CLINIC | Age: 59
End: 2024-02-28
Payer: MEDICARE

## 2024-03-04 ENCOUNTER — TELEPHONE (OUTPATIENT)
Dept: ELECTROPHYSIOLOGY | Facility: CLINIC | Age: 59
End: 2024-03-04
Payer: MEDICARE

## 2024-03-04 NOTE — PROGRESS NOTES
Subjective:   Patient ID:  Ismael Gibson Jr. is a 58 y.o. male who presents for evaluation of ICD lead malfunction    Referring Cardiologist: Evan Marks MD  Primary Care Physician: Dave Diaz MD    HPI  I had the pleasure of seeing Mr. Arthur Nixon today in our electrophysiology clinic in consultation for his dysfunctional ICD lead. As you are aware he is a pleasant 58 year-old man with chronic ischemic CMP with LVEF of 25%, CAD s/p CABG in 2018, out-of-hospital arrest/VF s/p ICD implantation in 2020, hypertension, type 2 diabetes mellitus and ESRD on HD TThS via right arm AVF. Over the past several months he has been observed to have increasing RV pace-sense lead impedance now >3000 ohms. He presents to discuss lead extraction/reimplantation.    In-clinic ICD interrogation notes stable lead parameters other than the pace-sense impedance, HV impedance is normal. MMVT noted 1/11/2022, treated with ATP    My interpretation of today's in-clinic ECG is sinus rhythm with a narrow QRS.    Review of Systems   Constitutional: Negative for fever and malaise/fatigue.   HENT:  Negative for congestion and sore throat.    Eyes:  Negative for blurred vision and visual disturbance.   Cardiovascular:  Negative for chest pain, dyspnea on exertion, irregular heartbeat, near-syncope, palpitations and syncope.   Respiratory:  Negative for cough and shortness of breath.    Hematologic/Lymphatic: Negative for bleeding problem. Does not bruise/bleed easily.   Skin: Negative.    Musculoskeletal: Negative.    Gastrointestinal:  Negative for bloating, abdominal pain, hematochezia and melena.   Neurological:  Negative for focal weakness and weakness.   Psychiatric/Behavioral: Negative.         Objective:   Physical Exam  Vitals reviewed.   Constitutional:       General: He is not in acute distress.     Appearance: He is well-developed. He is not diaphoretic.   HENT:      Head: Normocephalic and atraumatic.   Eyes:      General:          Right eye: No discharge.         Left eye: No discharge.      Conjunctiva/sclera: Conjunctivae normal.   Cardiovascular:      Rate and Rhythm: Normal rate and regular rhythm.      Heart sounds: No murmur heard.     No friction rub. No gallop.   Pulmonary:      Effort: Pulmonary effort is normal. No respiratory distress.      Breath sounds: Normal breath sounds. No wheezing or rales.   Abdominal:      General: Bowel sounds are normal. There is no distension.      Palpations: Abdomen is soft.      Tenderness: There is no abdominal tenderness.   Musculoskeletal:      Cervical back: Neck supple.   Skin:     General: Skin is warm and dry.   Neurological:      Mental Status: He is alert and oriented to person, place, and time.   Psychiatric:         Behavior: Behavior normal.         Thought Content: Thought content normal.         Judgment: Judgment normal.       Assessment:      1. Malfunction of implantable defibrillator ventricular (ICD) lead    2. S/P ICD (internal cardiac defibrillator) procedure    3. Abnormal lead impedance of implantable cardioverter-defibrillator, initial encounter    4. History of CVA (cerebrovascular accident)    5. Coronary artery disease of bypass graft of native heart with stable angina pectoris    6. Chronic combined systolic (congestive) and diastolic (congestive) heart failure    7. Cardiac arrest    8. ESRD (end stage renal disease)    9. Type 2 diabetes mellitus with hyperglycemia, without long-term current use of insulin    10. Ventricular fibrillation        Plan:   In summary, Mr. Arthur Nixon is a pleasant 58 year-old man with chronic ischemic CMP with LVEF of 25%, CAD s/p CABG in 2018, out-of-hospital arrest/VF s/p ICD implantation in 2020, hypertension, type 2 diabetes mellitus and ESRD on HD TThS via right arm AVF. Discussed options of implantation of a new ICD lead and abandoning old lead versus extraction/reimplantation. Discussed implanted cardiac device lead extraction,  including all risks and benefits at length. The risks discussed included but were not limited to death, MI, CVA, pneumothorax, severe bleeding, perforation with need for surgical repair, infection, tamponade. Discussed possible/likely need for laser sheath removal of leads and/or the use of other specialized tools for extraction.  I discussed with patient risks, indications, benefits, and alternatives of the planned procedure. All questions were answered. Patient understands and wishes to proceed with extraction/reimplantation.    Discussed [facility] policy of not providing back-up for patients with prior CABG. He understood. Recommended deactivation of ICD and rx a Lifevest as a fractured pace-sense ICD lead could result in lead noise and inappropriate shocks. He declines due to prior experience with a Lifevest. He understands the risks.    Plan  ICD lead extraction-reimplantation   [employer]  Anesthesia  SHA  Type and cross 4u PRBCs    Thank you for allowing me to participate in the care of this patient. Please do not hesitate to call me with any questions or concerns.    Errol Santillan MD, PhD  Cardiac Electrophysiology

## 2024-03-05 ENCOUNTER — CLINICAL SUPPORT (OUTPATIENT)
Dept: CARDIOLOGY | Facility: HOSPITAL | Age: 59
End: 2024-03-05
Attending: INTERNAL MEDICINE
Payer: MEDICARE

## 2024-03-05 ENCOUNTER — OFFICE VISIT (OUTPATIENT)
Dept: ELECTROPHYSIOLOGY | Facility: CLINIC | Age: 59
End: 2024-03-05
Payer: MEDICARE

## 2024-03-05 VITALS
DIASTOLIC BLOOD PRESSURE: 70 MMHG | HEIGHT: 68 IN | BODY MASS INDEX: 33.75 KG/M2 | HEART RATE: 57 BPM | SYSTOLIC BLOOD PRESSURE: 114 MMHG | WEIGHT: 222.69 LBS

## 2024-03-05 DIAGNOSIS — T82.198A MALFUNCTION OF IMPLANTABLE DEFIBRILLATOR VENTRICULAR (ICD) LEAD: Primary | ICD-10-CM

## 2024-03-05 DIAGNOSIS — I46.9 CARDIAC ARREST: ICD-10-CM

## 2024-03-05 DIAGNOSIS — Z95.810 S/P ICD (INTERNAL CARDIAC DEFIBRILLATOR) PROCEDURE: ICD-10-CM

## 2024-03-05 DIAGNOSIS — Z95.810 PRESENCE OF AUTOMATIC (IMPLANTABLE) CARDIAC DEFIBRILLATOR: ICD-10-CM

## 2024-03-05 DIAGNOSIS — T82.190A: ICD-10-CM

## 2024-03-05 DIAGNOSIS — I50.42 CHRONIC COMBINED SYSTOLIC (CONGESTIVE) AND DIASTOLIC (CONGESTIVE) HEART FAILURE: Chronic | ICD-10-CM

## 2024-03-05 DIAGNOSIS — E11.65 TYPE 2 DIABETES MELLITUS WITH HYPERGLYCEMIA, WITHOUT LONG-TERM CURRENT USE OF INSULIN: ICD-10-CM

## 2024-03-05 DIAGNOSIS — Z86.73 HISTORY OF CVA (CEREBROVASCULAR ACCIDENT): ICD-10-CM

## 2024-03-05 DIAGNOSIS — I63.9 ISCHEMIC STROKE: ICD-10-CM

## 2024-03-05 DIAGNOSIS — I49.9 CARDIAC ARRHYTHMIA, UNSPECIFIED CARDIAC ARRHYTHMIA TYPE: ICD-10-CM

## 2024-03-05 DIAGNOSIS — N18.6 ESRD (END STAGE RENAL DISEASE): ICD-10-CM

## 2024-03-05 DIAGNOSIS — I49.01 VENTRICULAR FIBRILLATION: ICD-10-CM

## 2024-03-05 DIAGNOSIS — I25.708 CORONARY ARTERY DISEASE OF BYPASS GRAFT OF NATIVE HEART WITH STABLE ANGINA PECTORIS: ICD-10-CM

## 2024-03-05 PROCEDURE — 93005 ELECTROCARDIOGRAM TRACING: CPT | Mod: S$GLB,,, | Performed by: INTERNAL MEDICINE

## 2024-03-05 PROCEDURE — 3066F NEPHROPATHY DOC TX: CPT | Mod: CPTII,S$GLB,, | Performed by: INTERNAL MEDICINE

## 2024-03-05 PROCEDURE — 1160F RVW MEDS BY RX/DR IN RCRD: CPT | Mod: CPTII,S$GLB,, | Performed by: INTERNAL MEDICINE

## 2024-03-05 PROCEDURE — 1159F MED LIST DOCD IN RCRD: CPT | Mod: CPTII,S$GLB,, | Performed by: INTERNAL MEDICINE

## 2024-03-05 PROCEDURE — 93010 ELECTROCARDIOGRAM REPORT: CPT | Mod: S$GLB,,, | Performed by: INTERNAL MEDICINE

## 2024-03-05 PROCEDURE — 3044F HG A1C LEVEL LT 7.0%: CPT | Mod: CPTII,S$GLB,, | Performed by: INTERNAL MEDICINE

## 2024-03-05 PROCEDURE — 93282 PRGRMG EVAL IMPLANTABLE DFB: CPT

## 2024-03-05 PROCEDURE — 3074F SYST BP LT 130 MM HG: CPT | Mod: CPTII,S$GLB,, | Performed by: INTERNAL MEDICINE

## 2024-03-05 PROCEDURE — 3078F DIAST BP <80 MM HG: CPT | Mod: CPTII,S$GLB,, | Performed by: INTERNAL MEDICINE

## 2024-03-05 PROCEDURE — 3008F BODY MASS INDEX DOCD: CPT | Mod: CPTII,S$GLB,, | Performed by: INTERNAL MEDICINE

## 2024-03-05 PROCEDURE — 93282 PRGRMG EVAL IMPLANTABLE DFB: CPT | Mod: 26,,, | Performed by: INTERNAL MEDICINE

## 2024-03-05 PROCEDURE — 99205 OFFICE O/P NEW HI 60 MIN: CPT | Mod: S$GLB,,, | Performed by: INTERNAL MEDICINE

## 2024-03-05 PROCEDURE — 99999 PR PBB SHADOW E&M-EST. PATIENT-LVL IV: CPT | Mod: PBBFAC,,, | Performed by: INTERNAL MEDICINE

## 2024-03-06 ENCOUNTER — PATIENT MESSAGE (OUTPATIENT)
Dept: ELECTROPHYSIOLOGY | Facility: CLINIC | Age: 59
End: 2024-03-06
Payer: MEDICARE

## 2024-03-06 LAB
OHS QRS DURATION: 126 MS
OHS QTC CALCULATION: 453 MS

## 2024-03-07 ENCOUNTER — TELEPHONE (OUTPATIENT)
Dept: ELECTROPHYSIOLOGY | Facility: CLINIC | Age: 59
End: 2024-03-07
Payer: MEDICARE

## 2024-03-07 ENCOUNTER — HOSPITAL ENCOUNTER (OUTPATIENT)
Dept: CARDIOLOGY | Facility: HOSPITAL | Age: 59
Discharge: HOME | End: 2024-03-07
Attending: INTERNAL MEDICINE
Payer: MEDICARE

## 2024-03-07 DIAGNOSIS — Z95.810 PRESENCE OF AUTOMATIC (IMPLANTABLE) CARDIAC DEFIBRILLATOR: ICD-10-CM

## 2024-03-07 PROCEDURE — 93296 REM INTERROG EVL PM/IDS: CPT | Mod: PO | Performed by: INTERNAL MEDICINE

## 2024-03-07 PROCEDURE — 93295 DEV INTERROG REMOTE 1/2/MLT: CPT | Mod: ,,, | Performed by: INTERNAL MEDICINE

## 2024-03-07 NOTE — TELEPHONE ENCOUNTER
Spoke with Patient's wife Benita. Scheduled for Extraction Reimplant procedure on 4/24/2024 with Dr Santillan. Procedure details reviewed and advised that pre procedure patient instructions will be sent via patient portal as requested. Advised to call the office for any questions or concerns prior to scheduled procedure. Understanding verbalized.

## 2024-03-13 DIAGNOSIS — I46.9 CARDIAC ARREST WITH VENTRICULAR FIBRILLATION: ICD-10-CM

## 2024-03-13 DIAGNOSIS — I49.9 CARDIAC ARRHYTHMIA, UNSPECIFIED CARDIAC ARRHYTHMIA TYPE: ICD-10-CM

## 2024-03-13 DIAGNOSIS — I50.42 CHRONIC COMBINED SYSTOLIC AND DIASTOLIC HEART FAILURE: ICD-10-CM

## 2024-03-13 DIAGNOSIS — I49.01 CARDIAC ARREST WITH VENTRICULAR FIBRILLATION: ICD-10-CM

## 2024-03-13 DIAGNOSIS — I25.5 ISCHEMIC CARDIOMYOPATHY WITH IMPLANTABLE CARDIOVERTER-DEFIBRILLATOR (ICD): ICD-10-CM

## 2024-03-13 DIAGNOSIS — Z95.810 ISCHEMIC CARDIOMYOPATHY WITH IMPLANTABLE CARDIOVERTER-DEFIBRILLATOR (ICD): ICD-10-CM

## 2024-03-13 DIAGNOSIS — T82.190A FRACTURE OF VENTRICULAR ELECTRODE LEAD OF IMPLANTABLE CARDIOVERTER-DEFIBRILLATOR (ICD): Primary | ICD-10-CM

## 2024-03-13 DIAGNOSIS — Z01.818 PRE-OP TESTING: ICD-10-CM

## 2024-03-19 ENCOUNTER — PATIENT MESSAGE (OUTPATIENT)
Dept: ELECTROPHYSIOLOGY | Facility: CLINIC | Age: 59
End: 2024-03-19
Payer: MEDICARE

## 2024-04-11 PROBLEM — J81.0 ACUTE PULMONARY EDEMA: Status: ACTIVE | Noted: 2024-04-11

## 2024-04-11 PROBLEM — J90 PLEURAL EFFUSION: Status: ACTIVE | Noted: 2024-04-11

## 2024-04-11 PROBLEM — J96.01 ACUTE RESPIRATORY FAILURE WITH HYPOXIA: Status: ACTIVE | Noted: 2024-04-11

## 2024-04-13 PROBLEM — S62.606A CLOSED FRACTURE OF PHALANX OF RIGHT LITTLE FINGER: Status: ACTIVE | Noted: 2024-04-13

## 2024-04-13 PROBLEM — R00.1 SINUS BRADYCARDIA: Status: ACTIVE | Noted: 2024-04-13

## 2024-04-13 PROBLEM — K59.00 CONSTIPATION: Status: ACTIVE | Noted: 2024-04-13

## 2024-04-13 PROBLEM — S62.336A CLOSED DISPLACED FRACTURE OF NECK OF FIFTH METACARPAL BONE OF RIGHT HAND: Status: ACTIVE | Noted: 2024-04-13

## 2024-04-13 PROBLEM — I50.43 ACUTE ON CHRONIC COMBINED SYSTOLIC (CONGESTIVE) AND DIASTOLIC (CONGESTIVE) HEART FAILURE: Status: ACTIVE | Noted: 2024-04-11

## 2024-04-15 ENCOUNTER — TELEPHONE (OUTPATIENT)
Dept: ELECTROPHYSIOLOGY | Facility: CLINIC | Age: 59
End: 2024-04-15
Payer: MEDICARE

## 2024-04-15 NOTE — TELEPHONE ENCOUNTER
----- Message from Blanca Casillas MA sent at 4/15/2024  2:23 PM CDT -----  Contact: self  Please advise. But I already reschedule her wound appt.     Thanks  ----- Message -----  From: Diane Correa  Sent: 4/15/2024   1:43 PM CDT  To: [facility] Staff    Pts wife Benita is req a call back, pt is back hospital at [facility] and broke his finger on right hand.  4/24/24 he is supposed to have his device repaired and then he will not have use of the other hand and Benita can't lift him.  Please call Benita back to advise at [phone].  thanks

## 2024-04-15 NOTE — TELEPHONE ENCOUNTER
Spoke with patient's wife who states that the patient broke his rt hand and required surgery and will require at least 6-7 weeks of recovery before he will be able to fully use his hand which is necessary prior to his Extraction surgery. Patient was currently scheduled for Extraction/Reimplant on 4/24/24 which will be postponed until patient is able to recover and have full use of hand which he will need after Extraction procedure. Dr Santillan notified.

## 2024-04-16 PROBLEM — Z75.8 DISCHARGE PLANNING ISSUES: Status: ACTIVE | Noted: 2024-04-16

## 2024-05-02 LAB
OHS CV AF BURDEN PERCENT: < 1
OHS CV DC REMOTE DEVICE TYPE: NORMAL
OHS CV ICD SHOCK: NO
OHS CV RV PACING PERCENT: 0 %

## 2024-05-15 ENCOUNTER — TELEPHONE (OUTPATIENT)
Dept: ELECTROPHYSIOLOGY | Facility: CLINIC | Age: 59
End: 2024-05-15
Payer: MEDICARE

## 2024-05-15 NOTE — TELEPHONE ENCOUNTER
----- Message from Blanca Casillas MA sent at 5/15/2024  3:22 PM CDT -----    ----- Message -----  From: Dm Gifford  Sent: 5/15/2024   2:25 PM CDT  To: [facility] Staff    Type:  Procedure     Who Called:family member  Does the patient know what this is regarding?:procedure   Would the patient rather a call back or a response via [facility]? Call   Best Call Back Number: [phone]  Additional Information:

## 2024-05-15 NOTE — TELEPHONE ENCOUNTER
Spoke with patient's wife who states that the patient followed up with ortho for his post op after his hand surgery and was told that he will not be healed enough to proceed with his Extraction on the date tentatively saved on 6/10/2024.  She states that the patient's next appointment with ortho will be on 6/12/2024 and she will keep me updated on the progress of his recovery so that we can plan for a future date.

## 2024-05-23 ENCOUNTER — TELEPHONE (OUTPATIENT)
Dept: CARDIOLOGY | Facility: CLINIC | Age: 59
End: 2024-05-23
Payer: MEDICARE

## 2024-05-23 DIAGNOSIS — I38 LEAKY HEART VALVE: ICD-10-CM

## 2024-05-23 DIAGNOSIS — I50.42 CHRONIC COMBINED SYSTOLIC (CONGESTIVE) AND DIASTOLIC (CONGESTIVE) HEART FAILURE: Primary | Chronic | ICD-10-CM

## 2024-05-23 NOTE — TELEPHONE ENCOUNTER
----- Message from Robbie Marks MD sent at 5/23/2024 12:58 PM CDT -----  Regarding: FW: advice  Will be happy to see him within the next 2-3 weeks with an echocardiogram performed at the office, read by me  ----- Message -----  From: Rissa Eli LPN  Sent: 5/23/2024  10:32 AM CDT  To: Robbie Marks MD  Subject: FW: advice                                       Pt has appointment with you 8/21/24. Would you like to see pt earlier?  ----- Message -----  From: Reji Oviedo  Sent: 5/23/2024   9:13 AM CDT  To: Kendrick Montez  Subject: advice                                           Type:  Needs Medical Advice    Who Called: donnie Springer Call Back Number: [phone]      Additional Information: wife st that pt is in the hospital, and st that they're saying pt has a leaking heart donovan. They want to do a consult with dr marks. The dr is cesia de santiago with [facility].    please call to discuss.

## 2024-05-23 NOTE — TELEPHONE ENCOUNTER
Spoke to wife--she just wanted to let Dt Evan what's going on. She declined to make an appt at this time and will call when pt is discharged

## 2024-05-28 ENCOUNTER — PATIENT MESSAGE (OUTPATIENT)
Dept: CARDIOLOGY | Facility: HOSPITAL | Age: 59
End: 2024-05-28
Payer: MEDICARE

## 2024-06-06 ENCOUNTER — CLINICAL SUPPORT (OUTPATIENT)
Dept: CARDIOLOGY | Facility: HOSPITAL | Age: 59
End: 2024-06-06
Payer: MEDICARE

## 2024-06-06 ENCOUNTER — HOSPITAL ENCOUNTER (OUTPATIENT)
Dept: CARDIOLOGY | Facility: HOSPITAL | Age: 59
Discharge: HOME | End: 2024-06-06
Attending: INTERNAL MEDICINE

## 2024-06-06 DIAGNOSIS — Z95.810 PRESENCE OF AUTOMATIC (IMPLANTABLE) CARDIAC DEFIBRILLATOR: ICD-10-CM

## 2024-06-06 PROCEDURE — 93295 DEV INTERROG REMOTE 1/2/MLT: CPT | Mod: ,,, | Performed by: INTERNAL MEDICINE

## 2024-06-06 PROCEDURE — 93296 REM INTERROG EVL PM/IDS: CPT | Mod: PO | Performed by: INTERNAL MEDICINE

## 2024-06-11 LAB
OHS CV AF BURDEN PERCENT: < 1
OHS CV DC REMOTE DEVICE TYPE: NORMAL
OHS CV RV PACING PERCENT: 0 %

## 2024-06-23 PROBLEM — I99.8 ISCHEMIC FINGER: Status: ACTIVE | Noted: 2024-06-23

## 2024-06-23 PROBLEM — M10.9 GOUT: Status: ACTIVE | Noted: 2024-06-23

## 2024-06-24 ENCOUNTER — DOCUMENTATION ONLY (OUTPATIENT)
Dept: CARDIOLOGY | Facility: CLINIC | Age: 59
End: 2024-06-24
Payer: MEDICARE

## 2024-07-02 ENCOUNTER — TELEPHONE (OUTPATIENT)
Dept: ELECTROPHYSIOLOGY | Facility: CLINIC | Age: 59
End: 2024-07-02
Payer: MEDICARE

## 2024-07-02 NOTE — TELEPHONE ENCOUNTER
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 Currently admitted for ongoing treatment and antibiotics.  Tentative date of 7/24/2024 was being held for patient's Extraction Procedure, but will not be enough time to recover from current status. Wife instructed to keep me posted on patient's status and recovery so that we can get the patient scheduled as soon as he is well enough for the procedure.

## 2024-07-11 PROBLEM — D72.829 LEUKOCYTOSIS: Status: ACTIVE | Noted: 2024-07-11
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
## 2024-07-11 PROBLEM — R18.8 OTHER ASCITES: Status: ACTIVE | Noted: 2024-07-11

## 2024-07-12 PROBLEM — Z73.6 LIMITATION OF ACTIVITY DUE TO DISABILITY: Status: ACTIVE | Noted: 2024-07-12

## 2024-07-13 PROBLEM — I47.29 PAROXYSMAL VENTRICULAR TACHYCARDIA: Status: ACTIVE | Noted: 2024-07-13

## 2024-07-13 PROBLEM — T82.118A AICD MALFUNCTION: Status: ACTIVE | Noted: 2024-07-13

## 2024-07-13 PROBLEM — I25.5 ISCHEMIC CARDIOMYOPATHY: Status: ACTIVE | Noted: 2024-07-13

## 2024-07-14 PROBLEM — I95.9 HYPOTENSION: Status: ACTIVE | Noted: 2024-07-14

## 2024-07-14 PROBLEM — N18.6 ESRD (END STAGE RENAL DISEASE) ON DIALYSIS: Status: ACTIVE | Noted: 2024-07-14

## 2024-07-14 PROBLEM — I96 GANGRENE OF FINGER OF RIGHT HAND: Status: ACTIVE | Noted: 2024-07-14

## 2024-07-14 PROBLEM — Z99.2 ESRD (END STAGE RENAL DISEASE) ON DIALYSIS: Status: ACTIVE | Noted: 2024-07-14

## 2024-07-15 PROBLEM — J96.01 ACUTE RESPIRATORY FAILURE WITH HYPOXIA: Status: RESOLVED | Noted: 2024-04-11 | Resolved: 2024-07-15

## 2024-07-18 ENCOUNTER — CLINICAL SUPPORT (OUTPATIENT)
Dept: CARDIOLOGY | Facility: HOSPITAL | Age: 59
End: 2024-07-18
Payer: MEDICARE

## 2024-07-18 DIAGNOSIS — Z95.810 PRESENCE OF AUTOMATIC (IMPLANTABLE) CARDIAC DEFIBRILLATOR: ICD-10-CM

## 2024-07-24 PROBLEM — Z86.73 HISTORY OF CVA (CEREBROVASCULAR ACCIDENT): Status: RESOLVED | Noted: 2017-12-24 | Resolved: 2024-07-24

## 2024-07-24 PROBLEM — I73.9 PVD (PERIPHERAL VASCULAR DISEASE): Status: ACTIVE | Noted: 2024-07-24

## 2024-07-25 PROBLEM — K59.00 CONSTIPATION: Status: RESOLVED | Noted: 2024-04-13 | Resolved: 2024-07-25

## 2024-07-25 PROBLEM — R18.8 OTHER ASCITES: Status: RESOLVED | Noted: 2024-07-11 | Resolved: 2024-07-25

## 2024-07-25 PROBLEM — Z72.0 TOBACCO ABUSE: Status: RESOLVED | Noted: 2017-12-07 | Resolved: 2024-07-25

## 2024-07-26 PROBLEM — N18.9 ANEMIA DUE TO CHRONIC KIDNEY DISEASE: Status: ACTIVE | Noted: 2024-07-26

## 2024-07-26 PROBLEM — D63.1 ANEMIA DUE TO CHRONIC KIDNEY DISEASE: Status: ACTIVE | Noted: 2024-07-26

## 2024-07-29 LAB
OHS CV DC REMOTE DEVICE TYPE: NORMAL
OHS CV ICD SHOCK: NO

## 2024-08-01 ENCOUNTER — TELEPHONE (OUTPATIENT)
Dept: VASCULAR SURGERY | Facility: CLINIC | Age: 59
End: 2024-08-01
Payer: MEDICARE

## 2024-08-01 NOTE — TELEPHONE ENCOUNTER
Duplicate  ----- Message from Reji Oviedo sent at 8/1/2024  4:29 PM CDT -----  Regarding: appt  Type:  Sooner Apoointment Request      Name of Caller:pt     When is the first available appointment?dept booked     Symptoms:hosp fu       Best Call Back Number:[phone]      Additional Information: pt needs an hosp fu appt being discharge today.  please call to discuss.

## 2024-08-01 NOTE — TELEPHONE ENCOUNTER
Appt scheduled for 8/22    ----- Message from Tequila De Leon sent at 8/1/2024  4:16 PM CDT -----  Contact[facility]  Type:  Appointment Request    Caller is requesting a sooner appointment.  Caller declined first available appointment listed below.  Caller will not accept being placed on the waitlist and is requesting a message be sent to doctor.    Name of Caller:  Jose from [facility]  When is the first available appointment?  N/A    Would the patient rather a call back or a response via [facility]?   Call back  Best Call Back Number:  [phone]    Additional Information:   States patient needs a two-week surgical follow up appointment - states discharge date is 8/1/2024 - please call patient to schedule - ihsan li

## 2024-08-22 ENCOUNTER — OFFICE VISIT (OUTPATIENT)
Dept: VASCULAR SURGERY | Facility: CLINIC | Age: 59
End: 2024-08-22
Payer: MEDICARE

## 2024-08-22 VITALS
DIASTOLIC BLOOD PRESSURE: 46 MMHG | BODY MASS INDEX: 25.91 KG/M2 | HEIGHT: 68 IN | SYSTOLIC BLOOD PRESSURE: 129 MMHG | HEART RATE: 56 BPM | WEIGHT: 171 LBS

## 2024-08-22 DIAGNOSIS — Z89.612 S/P AKA (ABOVE KNEE AMPUTATION) UNILATERAL, LEFT: Primary | ICD-10-CM

## 2024-08-22 PROCEDURE — 99999 PR PBB SHADOW E&M-EST. PATIENT-LVL III: CPT | Mod: PBBFAC,,, | Performed by: STUDENT IN AN ORGANIZED HEALTH CARE EDUCATION/TRAINING PROGRAM

## 2024-08-22 NOTE — PROGRESS NOTES
[facility]  [facility]  Follow-up Visit    Ismael Gibson Jr. is a 58 y.o. male who is following up for  s/p left AKA.     Doing well. Minimal drainage from left stump    Medications and Allergies:  Reviewed and updated today.    Vitals:  Vitals:    08/22/24 1135   BP: (!) 129/46   Pulse: (!) 56          Physical Exam  Left AKA stump is intact    Plan:  F/u prn  Ok to start fitting for prosthetic in 2wks.       MD Dayana  Vascular Surgery

## 2024-09-05 ENCOUNTER — CLINICAL SUPPORT (OUTPATIENT)
Dept: CARDIOLOGY | Facility: HOSPITAL | Age: 59
End: 2024-09-05
Payer: MEDICARE

## 2024-09-05 DIAGNOSIS — Z95.810 PRESENCE OF AUTOMATIC (IMPLANTABLE) CARDIAC DEFIBRILLATOR: ICD-10-CM

## 2024-09-12 PROBLEM — L97.509 DIABETIC FOOT ULCER: Status: ACTIVE | Noted: 2024-09-12

## 2024-09-12 PROBLEM — E11.621 DIABETIC FOOT ULCER: Status: ACTIVE | Noted: 2024-09-12

## 2024-09-13 PROBLEM — E11.628 DIABETIC FOOT INFECTION: Status: ACTIVE | Noted: 2024-09-13

## 2024-09-13 PROBLEM — L08.9 DIABETIC FOOT INFECTION: Status: ACTIVE | Noted: 2024-09-13

## 2024-09-16 PROBLEM — E11.621 DIABETIC FOOT ULCER: Status: RESOLVED | Noted: 2024-09-12 | Resolved: 2024-09-16

## 2024-09-16 PROBLEM — L97.509 DIABETIC FOOT ULCER: Status: RESOLVED | Noted: 2024-09-12 | Resolved: 2024-09-16

## 2024-09-17 PROBLEM — E78.00 PURE HYPERCHOLESTEROLEMIA: Status: RESOLVED | Noted: 2017-12-24 | Resolved: 2024-09-17

## 2024-09-18 PROBLEM — L97.419 DIABETIC ULCER OF RIGHT MIDFOOT ASSOCIATED WITH TYPE 2 DIABETES MELLITUS: Status: RESOLVED | Noted: 2024-09-12 | Resolved: 2024-09-18

## 2024-09-18 PROBLEM — E11.621 DIABETIC ULCER OF RIGHT MIDFOOT ASSOCIATED WITH TYPE 2 DIABETES MELLITUS: Status: RESOLVED | Noted: 2024-09-12 | Resolved: 2024-09-18

## 2024-09-18 PROBLEM — Z74.09 OTHER REDUCED MOBILITY: Status: ACTIVE | Noted: 2024-09-18

## 2024-09-18 PROBLEM — L97.419 DIABETIC ULCER OF RIGHT MIDFOOT ASSOCIATED WITH TYPE 2 DIABETES MELLITUS: Status: ACTIVE | Noted: 2024-09-12

## 2024-09-18 LAB
OHS CV AF BURDEN PERCENT: < 1
OHS CV DC REMOTE DEVICE TYPE: NORMAL
OHS CV RV PACING PERCENT: 0 %

## 2024-09-20 PROBLEM — I50.42 CHRONIC COMBINED SYSTOLIC AND DIASTOLIC CHF (CONGESTIVE HEART FAILURE): Status: ACTIVE | Noted: 2024-09-20

## 2024-09-20 PROBLEM — Z71.89 ACP (ADVANCE CARE PLANNING): Status: ACTIVE | Noted: 2024-09-20

## 2024-09-20 PROBLEM — I50.42 CHRONIC COMBINED SYSTOLIC AND DIASTOLIC CHF (CONGESTIVE HEART FAILURE): Status: RESOLVED | Noted: 2024-09-20 | Resolved: 2024-09-20

## 2024-09-20 PROBLEM — G40.909 SEIZURE DISORDER: Status: ACTIVE | Noted: 2019-03-14

## 2024-09-23 ENCOUNTER — TELEPHONE (OUTPATIENT)
Dept: VASCULAR SURGERY | Facility: CLINIC | Age: 59
End: 2024-09-23
Payer: MEDICARE

## 2024-09-23 NOTE — TELEPHONE ENCOUNTER
P/O Appt scheduled 10/24     ----- Message from Lizzy Katja sent at 9/23/2024 12:33 PM CDT -----  Regarding: appointment  Contact: patient  Type:  Sooner Appointment Request    Caller is requesting a sooner appointment.  Caller declined first available appointment listed below.  Caller will not accept being placed on the waitlist and is requesting a message be sent to doctor.    Name of Caller:  patient  When is the first available appointment?    Symptoms:  hospital follow up  Would the patient rather a call back or a response via [facility]? call  Best Call Back Number:  [phone] (home)     Additional Information:  Please call patient to schedule.  Thanks!

## 2024-09-23 NOTE — TELEPHONE ENCOUNTER
Spoke to - Mark RN with [facility]  - advised dressing can be removed 48 hours after discharge - site should be FEL[place] unless drainage or dehiscence occurs, if so please call back with information. Verblaized understanding.     ----- Message from Deb Beaver sent at 9/23/2024  8:39 AM CDT -----  Regarding: Home health leaving message  Type: Needs Medical Advice  Who Called:  [facility]     Best Call Back Number: [phone]  Additional Information: They are needing to speak to the office regarding getting an order to change out his dressing please call to advise

## 2024-09-23 NOTE — TELEPHONE ENCOUNTER
----- Message from Deb Beaver sent at 9/23/2024  8:39 AM CDT -----  Regarding: Home health leaving message  Type: Needs Medical Advice  Who Called:  [facility]     Best Call Back Number: [phone]  Additional Information: They are needing to speak to the office regarding getting an order to change out his dressing please call to advise

## 2024-10-16 PROBLEM — I25.5 ISCHEMIC CARDIOMYOPATHY: Status: RESOLVED | Noted: 2024-01-01 | Resolved: 2024-01-01

## 2024-10-16 PROBLEM — Z71.89 ACP (ADVANCE CARE PLANNING): Status: RESOLVED | Noted: 2024-01-01 | Resolved: 2024-01-01

## 2024-10-16 PROBLEM — Z71.89 ADVANCE CARE PLANNING: Status: RESOLVED | Noted: 2024-01-01 | Resolved: 2024-01-01

## 2024-10-16 PROBLEM — I50.22 CHRONIC HFREF (HEART FAILURE WITH REDUCED EJECTION FRACTION): Status: RESOLVED | Noted: 2018-11-11 | Resolved: 2024-01-01

## 2024-10-16 PROBLEM — I96 FINGER NECROSIS: Status: ACTIVE | Noted: 2024-01-01

## 2024-10-16 PROBLEM — Z74.09 OTHER REDUCED MOBILITY: Status: RESOLVED | Noted: 2024-01-01 | Resolved: 2024-01-01

## 2024-10-17 PROBLEM — Z51.5 ENCOUNTER FOR PALLIATIVE CARE: Status: ACTIVE | Noted: 2024-01-01

## 2024-10-17 PROBLEM — R57.9 SHOCK: Status: ACTIVE | Noted: 2024-01-01

## 2024-10-17 PROBLEM — I10 HYPERTENSION: Status: RESOLVED | Noted: 2017-12-07 | Resolved: 2024-01-01

## 2024-10-18 PROBLEM — T14.8XXA CHRONIC WOUND: Status: ACTIVE | Noted: 2024-01-01
